# Patient Record
Sex: MALE | Race: WHITE | NOT HISPANIC OR LATINO | Employment: OTHER | URBAN - METROPOLITAN AREA
[De-identification: names, ages, dates, MRNs, and addresses within clinical notes are randomized per-mention and may not be internally consistent; named-entity substitution may affect disease eponyms.]

---

## 2022-09-09 ENCOUNTER — HOSPITAL ENCOUNTER (OUTPATIENT)
Dept: RADIATION ONCOLOGY | Facility: HOSPITAL | Age: 67
Setting detail: RADIATION/ONCOLOGY SERIES
Discharge: HOME | End: 2022-09-09

## 2022-09-23 ENCOUNTER — TELEPHONE (OUTPATIENT)
Dept: RADIATION ONCOLOGY | Facility: HOSPITAL | Age: 67
End: 2022-09-23

## 2022-09-28 ENCOUNTER — OFFICE VISIT (OUTPATIENT)
Dept: RADIATION ONCOLOGY | Facility: HOSPITAL | Age: 67
End: 2022-09-28

## 2022-09-28 VITALS
OXYGEN SATURATION: 98 % | SYSTOLIC BLOOD PRESSURE: 154 MMHG | TEMPERATURE: 97.6 F | RESPIRATION RATE: 20 BRPM | DIASTOLIC BLOOD PRESSURE: 74 MMHG | WEIGHT: 179.5 LBS | HEART RATE: 56 BPM

## 2022-09-28 DIAGNOSIS — C61 PROSTATE CANCER: Primary | ICD-10-CM

## 2022-09-28 PROCEDURE — G0463 HOSPITAL OUTPT CLINIC VISIT: HCPCS

## 2022-09-28 RX ORDER — CHOLECALCIFEROL (VITAMIN D3) 125 MCG
5 CAPSULE ORAL
COMMUNITY
End: 2022-12-13

## 2022-09-28 RX ORDER — DONEPEZIL HYDROCHLORIDE 10 MG/1
10 TABLET, FILM COATED ORAL DAILY
COMMUNITY
Start: 2022-09-10
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
## 2022-09-29 ENCOUNTER — TELEPHONE (OUTPATIENT)
Dept: UROLOGY | Facility: CLINIC | Age: 67
End: 2022-09-29

## 2022-09-29 NOTE — TELEPHONE ENCOUNTER
Spoke to PT about referral to our office and scheduled appt. For 10/19/22 at 3:10pm with Dr. Pineda. Also sending reminder and NP paperwork.

## 2022-10-03 ENCOUNTER — TELEPHONE (OUTPATIENT)
Dept: UROLOGY | Facility: CLINIC | Age: 67
End: 2022-10-03

## 2022-10-03 NOTE — TELEPHONE ENCOUNTER
Provider: DR. BLANCO   Caller: EDWARD HANSEN   Relationship to Patient: SELF    Phone Number: [phone]  Reason for Call: PATIENT IS FILLING OUT NEW PATIENT PAPERWORK AND IS NOT SURE WHEN HIS LAST BIOPSY WAS. HE THINKS IT WAS ON 9/2/22. HE IS NOT SURE WHAT HIS LAST PSA VALUE WAS EITHER. HE IS ASKING IF WE HAVE THE RECORDS FROM DR. WEEMS'S AND DR. CINTRON'S OFFICE THAT SHOWS THIS.     PATIENT STATES HIS LATEST IMAGING ON CD AND REPORT WAS GIVEN TO DR. CINTRON'S OFFICE LAST WEEK ON 9/28/22 AND THEY WOULD UPLOAD IT TO HIS CHART.     PATIENT WILL CONTACT DR. WEEMS'S OFFICE TO CHECK ON BIOPSY DATE AND PSA VALUE BUT WANTS TO MAKE SURE DR. BLANCO HAS THE RECORDS.     PATIENT WAS WARM TRANSFERRED TO DR. CINTRON'S OFFICE TO CHECK ON IMAGING OF CT AND BONE SCAN.     PATIENT REQUESTING A CALL BACK.

## 2022-10-19 ENCOUNTER — OFFICE VISIT (OUTPATIENT)
Dept: UROLOGY | Facility: CLINIC | Age: 67
End: 2022-10-19

## 2022-10-19 VITALS — WEIGHT: 179 LBS

## 2022-10-19 DIAGNOSIS — C61 PROSTATE CANCER: Primary | ICD-10-CM

## 2022-10-19 PROCEDURE — 99204 OFFICE O/P NEW MOD 45 MIN: CPT | Performed by: UROLOGY
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
## 2022-10-26 ENCOUNTER — TELEPHONE (OUTPATIENT)
Dept: UROLOGY | Facility: CLINIC | Age: 67
End: 2022-10-26

## 2022-10-26 ENCOUNTER — OFFICE VISIT (OUTPATIENT)
Dept: UROLOGY | Facility: CLINIC | Age: 67
End: 2022-10-26

## 2022-10-26 VITALS — WEIGHT: 179 LBS

## 2022-10-26 DIAGNOSIS — N48.1 BALANITIS: ICD-10-CM

## 2022-10-26 DIAGNOSIS — C61 PROSTATE CANCER: Primary | ICD-10-CM

## 2022-10-26 PROCEDURE — 99214 OFFICE O/P EST MOD 30 MIN: CPT | Performed by: STUDENT IN AN ORGANIZED HEALTH CARE EDUCATION/TRAINING PROGRAM

## 2022-10-26 RX ORDER — SCOLOPAMINE TRANSDERMAL SYSTEM 1 MG/1
1 PATCH, EXTENDED RELEASE TRANSDERMAL CONTINUOUS
Status: CANCELLED | OUTPATIENT
Start: 2022-10-26 | End: 2022-10-29

## 2022-10-26 RX ORDER — MELOXICAM 7.5 MG/1
15 TABLET ORAL ONCE
Status: CANCELLED | OUTPATIENT
Start: 2022-10-26 | End: 2022-10-26

## 2022-10-26 RX ORDER — CLOTRIMAZOLE AND BETAMETHASONE DIPROPIONATE 10; .64 MG/G; MG/G
1 CREAM TOPICAL 2 TIMES DAILY
Qty: 45 G | Refills: 0 | Status: SHIPPED | OUTPATIENT
Start: 2022-10-26 | End: 2022-11-25

## 2022-10-26 RX ORDER — GABAPENTIN 100 MG/1
600 CAPSULE ORAL ONCE
Status: CANCELLED | OUTPATIENT
Start: 2022-10-26 | End: 2022-10-26

## 2022-10-26 RX ORDER — ACETAMINOPHEN 500 MG
1000 TABLET ORAL ONCE
Status: CANCELLED | OUTPATIENT
Start: 2022-10-26 | End: 2022-10-26

## 2022-10-26 NOTE — TELEPHONE ENCOUNTER
----- Message from Deondre Ocasio MD sent at 10/26/2022 12:23 PM EDT -----  Regarding: need outside path recrods  Need pathology from patient's prostate biopsy [facility] I believe. Performed by Dr Muller.     Also need prostate MRI imaging. Patient will try and get his disc.     Deondre Ocasio MD
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
## 2022-10-26 NOTE — TELEPHONE ENCOUNTER
Verbal Requested from [facility] (Medical Records) - Pathology records from Prostate Biopsy.  [phone]

## 2022-11-01 ENCOUNTER — TELEPHONE (OUTPATIENT)
Dept: UROLOGY | Facility: CLINIC | Age: 67
End: 2022-11-01

## 2022-11-01 NOTE — TELEPHONE ENCOUNTER
Tita from [facility] called and said the auth code doesn't meet for impatient. Requesting a call back. [phone] option 2

## 2022-12-13 ENCOUNTER — PRE-ADMISSION TESTING (OUTPATIENT)
Dept: PREADMISSION TESTING | Facility: HOSPITAL | Age: 67
End: 2022-12-13

## 2022-12-13 VITALS — BODY MASS INDEX: 23.69 KG/M2 | HEIGHT: 71 IN | WEIGHT: 169.2 LBS

## 2022-12-13 DIAGNOSIS — C61 PROSTATE CANCER: ICD-10-CM

## 2022-12-13 DIAGNOSIS — I44.7 LBBB (LEFT BUNDLE BRANCH BLOCK): Primary | ICD-10-CM

## 2022-12-13 DIAGNOSIS — C61 PROSTATE CANCER: Primary | ICD-10-CM

## 2022-12-13 LAB
ABO GROUP BLD: NORMAL
ANION GAP SERPL CALCULATED.3IONS-SCNC: 8 MMOL/L (ref 5–15)
BUN SERPL-MCNC: 11 MG/DL (ref 8–23)
BUN/CREAT SERPL: 11.1 (ref 7–25)
CALCIUM SPEC-SCNC: 9.7 MG/DL (ref 8.6–10.5)
CHLORIDE SERPL-SCNC: 103 MMOL/L (ref 98–107)
CO2 SERPL-SCNC: 31 MMOL/L (ref 22–29)
CREAT SERPL-MCNC: 0.99 MG/DL (ref 0.76–1.27)
DEPRECATED RDW RBC AUTO: 40.3 FL (ref 37–54)
EGFRCR SERPLBLD CKD-EPI 2021: 82.5 ML/MIN/1.73
ERYTHROCYTE [DISTWIDTH] IN BLOOD BY AUTOMATED COUNT: 12.8 % (ref 12.3–15.4)
GLUCOSE SERPL-MCNC: 104 MG/DL (ref 65–99)
HBA1C MFR BLD: 4.9 % (ref 4.8–5.6)
HCT VFR BLD AUTO: 46.8 % (ref 37.5–51)
HGB BLD-MCNC: 15.8 G/DL (ref 13–17.7)
MCH RBC QN AUTO: 29.5 PG (ref 26.6–33)
MCHC RBC AUTO-ENTMCNC: 33.8 G/DL (ref 31.5–35.7)
MCV RBC AUTO: 87.5 FL (ref 79–97)
PLATELET # BLD AUTO: 236 10*3/MM3 (ref 140–450)
PMV BLD AUTO: 10.4 FL (ref 6–12)
POTASSIUM SERPL-SCNC: 4.1 MMOL/L (ref 3.5–5.2)
QT INTERVAL: 464 MS
QTC INTERVAL: 478 MS
RBC # BLD AUTO: 5.35 10*6/MM3 (ref 4.14–5.8)
RH BLD: POSITIVE
SODIUM SERPL-SCNC: 142 MMOL/L (ref 136–145)
WBC NRBC COR # BLD: 5.09 10*3/MM3 (ref 3.4–10.8)

## 2022-12-13 PROCEDURE — 83036 HEMOGLOBIN GLYCOSYLATED A1C: CPT

## 2022-12-13 PROCEDURE — 80048 BASIC METABOLIC PNL TOTAL CA: CPT

## 2022-12-13 PROCEDURE — 86900 BLOOD TYPING SEROLOGIC ABO: CPT

## 2022-12-13 PROCEDURE — 93010 ELECTROCARDIOGRAM REPORT: CPT | Performed by: INTERNAL MEDICINE

## 2022-12-13 PROCEDURE — 93005 ELECTROCARDIOGRAM TRACING: CPT

## 2022-12-13 PROCEDURE — 85027 COMPLETE CBC AUTOMATED: CPT

## 2022-12-13 PROCEDURE — 86901 BLOOD TYPING SEROLOGIC RH(D): CPT

## 2022-12-13 PROCEDURE — 36415 COLL VENOUS BLD VENIPUNCTURE: CPT

## 2022-12-13 RX ORDER — MULTIPLE VITAMINS W/ MINERALS TAB 9MG-400MCG
1 TAB ORAL DAILY
COMMUNITY

## 2022-12-13 NOTE — PAT
Too early to draw type and screen in PAT.  Please obtain blood bank specimen in pre-op on the day of surgery.    Patient to apply Chlorhexadine wipes  to surgical area (as instructed) the night before procedure and the AM of procedure. Wipes provided.    Patient instructed to drink 20 ounces of Gatorade and it needs to be completed 1 hour (for Main OR patients) or 2 hours (scheduled  section & [facility] patients) before given arrival time for procedure (NO RED Gatorade)    Patient verbalized understanding.    Patient denies any current skin issues.     Patient did not review general PAT education video as instructed in their preoperative information received from their surgeon.  One-on-one Pre Admission Testing general education provided during PAT visit.  Copies of PAT general education handouts (Incentive Spirometry, Meds to Beds Program, Patient Belongings, Pre-op skin preparation instructions, Blood Glucose testing, Visitor policy, Surgery FAQ, Code H) distributed to patient. Encouraged patient/family to read PAT general education handouts thoroughly and notify PAT staff with any questions or concerns. Patient instructed to bring PAT pass and completed skin prep sheet (if applicable) on the day of procedure. Patient verbalized understanding of all information and priority content.     EKG showed LBBB; EKG faxed to anesthesia for review.  Called and spoke with Dr Puckett regarding EKG.  Per Dr Puckett patient needs cardiac clearance because no old EKG available showing LBBB.  Called office to notify of need for cardiac clearance.  Spoke with Lavelle,  Who will make arrangements for cardiac clearance.

## 2022-12-14 ENCOUNTER — HOSPITAL ENCOUNTER (OUTPATIENT)
Dept: CARDIOLOGY | Facility: HOSPITAL | Age: 67
Discharge: HOME | End: 2022-12-14
Admitting: PHYSICIAN ASSISTANT

## 2022-12-14 ENCOUNTER — OFFICE VISIT (OUTPATIENT)
Dept: CARDIOLOGY | Facility: CLINIC | Age: 67
End: 2022-12-14

## 2022-12-14 VITALS
BODY MASS INDEX: 24 KG/M2 | WEIGHT: 171.4 LBS | HEIGHT: 71 IN | DIASTOLIC BLOOD PRESSURE: 80 MMHG | SYSTOLIC BLOOD PRESSURE: 124 MMHG | OXYGEN SATURATION: 99 % | HEART RATE: 53 BPM

## 2022-12-14 DIAGNOSIS — I44.7 LBBB (LEFT BUNDLE BRANCH BLOCK): ICD-10-CM

## 2022-12-14 DIAGNOSIS — Z01.818 PREOPERATIVE EVALUATION TO RULE OUT SURGICAL CONTRAINDICATION: Primary | ICD-10-CM

## 2022-12-14 DIAGNOSIS — I44.7 LBBB (LEFT BUNDLE BRANCH BLOCK): Primary | ICD-10-CM

## 2022-12-14 LAB
BH CV ECHO MEAS - AO MAX PG: 7.4 MMHG
BH CV ECHO MEAS - AO MEAN PG: 4.3 MMHG
BH CV ECHO MEAS - AO ROOT DIAM: 3.3 CM
BH CV ECHO MEAS - AO V2 MAX: 136.3 CM/SEC
BH CV ECHO MEAS - AO V2 VTI: 28 CM
BH CV ECHO MEAS - AVA(I,D): 2.48 CM2
BH CV ECHO MEAS - EDV(CUBED): 93.1 ML
BH CV ECHO MEAS - EDV(MOD-SP2): 140 ML
BH CV ECHO MEAS - EDV(MOD-SP4): 69.4 ML
BH CV ECHO MEAS - EF(MOD-BP): 55 %
BH CV ECHO MEAS - EF(MOD-SP2): 53.7 %
BH CV ECHO MEAS - EF(MOD-SP4): 57.1 %
BH CV ECHO MEAS - ESV(CUBED): 33.3 ML
BH CV ECHO MEAS - ESV(MOD-SP2): 64.8 ML
BH CV ECHO MEAS - ESV(MOD-SP4): 29.8 ML
BH CV ECHO MEAS - FS: 29 %
BH CV ECHO MEAS - IVS/LVPW: 1.03 CM
BH CV ECHO MEAS - IVSD: 1.14 CM
BH CV ECHO MEAS - LA DIMENSION: 4 CM
BH CV ECHO MEAS - LAT PEAK E' VEL: 6.9 CM/SEC
BH CV ECHO MEAS - LV DIASTOLIC VOL/BSA (35-75): 35.3 CM2
BH CV ECHO MEAS - LV MASS(C)D: 183.1 GRAMS
BH CV ECHO MEAS - LV MAX PG: 4.9 MMHG
BH CV ECHO MEAS - LV MEAN PG: 2.7 MMHG
BH CV ECHO MEAS - LV SYSTOLIC VOL/BSA (12-30): 15.2 CM2
BH CV ECHO MEAS - LV V1 MAX: 110.3 CM/SEC
BH CV ECHO MEAS - LV V1 VTI: 22.8 CM
BH CV ECHO MEAS - LVIDD: 4.5 CM
BH CV ECHO MEAS - LVIDS: 3.2 CM
BH CV ECHO MEAS - LVOT AREA: 3 CM2
BH CV ECHO MEAS - LVOT DIAM: 1.97 CM
BH CV ECHO MEAS - LVPWD: 1.11 CM
BH CV ECHO MEAS - MED PEAK E' VEL: 6.4 CM/SEC
BH CV ECHO MEAS - MR MAX PG: 65.4 MMHG
BH CV ECHO MEAS - MR MAX VEL: 404.3 CM/SEC
BH CV ECHO MEAS - MR MEAN PG: 37.8 MMHG
BH CV ECHO MEAS - MR MEAN VEL: 287 CM/SEC
BH CV ECHO MEAS - MR VTI: 144.5 CM
BH CV ECHO MEAS - MV A MAX VEL: 68.1 CM/SEC
BH CV ECHO MEAS - MV DEC SLOPE: 157.5 CM/SEC2
BH CV ECHO MEAS - MV DEC TIME: 0.36 MSEC
BH CV ECHO MEAS - MV E MAX VEL: 47.6 CM/SEC
BH CV ECHO MEAS - MV E/A: 0.7
BH CV ECHO MEAS - MV MAX PG: 2.35 MMHG
BH CV ECHO MEAS - MV MEAN PG: 0.9 MMHG
BH CV ECHO MEAS - MV P1/2T: 115.4 MSEC
BH CV ECHO MEAS - MV V2 VTI: 25.2 CM
BH CV ECHO MEAS - MVA(P1/2T): 1.91 CM2
BH CV ECHO MEAS - MVA(VTI): 2.8 CM2
BH CV ECHO MEAS - PA ACC TIME: 0.14 SEC
BH CV ECHO MEAS - PA PR(ACCEL): 18 MMHG
BH CV ECHO MEAS - PA V2 MAX: 95.9 CM/SEC
BH CV ECHO MEAS - RAP SYSTOLE: 3 MMHG
BH CV ECHO MEAS - RVSP: 27 MMHG
BH CV ECHO MEAS - SI(MOD-SP2): 38.3 ML/M2
BH CV ECHO MEAS - SI(MOD-SP4): 20.2 ML/M2
BH CV ECHO MEAS - SV(LVOT): 69.4 ML
BH CV ECHO MEAS - SV(MOD-SP2): 75.2 ML
BH CV ECHO MEAS - SV(MOD-SP4): 39.6 ML
BH CV ECHO MEAS - TAPSE (>1.6): 2.6 CM
BH CV ECHO MEAS - TR MAX PG: 21.4 MMHG
BH CV ECHO MEAS - TR MAX VEL: 230.8 CM/SEC
BH CV ECHO MEASUREMENTS AVERAGE E/E' RATIO: 7.16
BH CV XLRA - RV BASE: 4.3 CM
BH CV XLRA - RV LENGTH: 7.7 CM
BH CV XLRA - RV MID: 3.2 CM
BH CV XLRA - TDI S': 11.6 CM/SEC
LEFT ATRIUM VOLUME INDEX: 21.5 ML/M2
MAXIMAL PREDICTED HEART RATE: 151 BPM
STRESS TARGET HR: 128 BPM

## 2022-12-14 PROCEDURE — 93306 TTE W/DOPPLER COMPLETE: CPT

## 2022-12-14 PROCEDURE — 93000 ELECTROCARDIOGRAM COMPLETE: CPT | Performed by: PHYSICIAN ASSISTANT

## 2022-12-14 PROCEDURE — 93306 TTE W/DOPPLER COMPLETE: CPT | Performed by: INTERNAL MEDICINE

## 2022-12-14 PROCEDURE — 99203 OFFICE O/P NEW LOW 30 MIN: CPT | Performed by: PHYSICIAN ASSISTANT
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
## 2022-12-16 ENCOUNTER — ANESTHESIA EVENT (OUTPATIENT)
Dept: PERIOP | Facility: HOSPITAL | Age: 67
End: 2022-12-16

## 2022-12-16 RX ORDER — SODIUM CHLORIDE 0.9 % (FLUSH) 0.9 %
10 SYRINGE (ML) INJECTION EVERY 12 HOURS SCHEDULED
Status: CANCELLED | OUTPATIENT
Start: 2022-12-16

## 2022-12-16 RX ORDER — SODIUM CHLORIDE 0.9 % (FLUSH) 0.9 %
10 SYRINGE (ML) INJECTION AS NEEDED
Status: CANCELLED | OUTPATIENT
Start: 2022-12-16

## 2022-12-16 RX ORDER — SODIUM CHLORIDE 9 MG/ML
40 INJECTION, SOLUTION INTRAVENOUS AS NEEDED
Status: CANCELLED | OUTPATIENT
Start: 2022-12-16

## 2022-12-16 RX ORDER — FAMOTIDINE 10 MG/ML
20 INJECTION, SOLUTION INTRAVENOUS ONCE
Status: CANCELLED | OUTPATIENT
Start: 2022-12-16 | End: 2022-12-16
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
## 2022-12-19 ENCOUNTER — HOSPITAL ENCOUNTER (OUTPATIENT)
Facility: HOSPITAL | Age: 67
Discharge: HOME | End: 2022-12-20
Attending: STUDENT IN AN ORGANIZED HEALTH CARE EDUCATION/TRAINING PROGRAM | Admitting: STUDENT IN AN ORGANIZED HEALTH CARE EDUCATION/TRAINING PROGRAM

## 2022-12-19 ENCOUNTER — ANESTHESIA (OUTPATIENT)
Dept: PERIOP | Facility: HOSPITAL | Age: 67
End: 2022-12-19

## 2022-12-19 ENCOUNTER — ANESTHESIA EVENT CONVERTED (OUTPATIENT)
Dept: ANESTHESIOLOGY | Facility: HOSPITAL | Age: 67
End: 2022-12-19

## 2022-12-19 ENCOUNTER — APPOINTMENT (OUTPATIENT)
Dept: GENERAL RADIOLOGY | Facility: HOSPITAL | Age: 67
End: 2022-12-19

## 2022-12-19 DIAGNOSIS — C61 PROSTATE CANCER: Primary | ICD-10-CM

## 2022-12-19 LAB
ABO GROUP BLD: NORMAL
ANION GAP SERPL CALCULATED.3IONS-SCNC: 11 MMOL/L (ref 5–15)
BLD GP AB SCN SERPL QL: NEGATIVE
BUN SERPL-MCNC: 10 MG/DL (ref 8–23)
BUN/CREAT SERPL: 9.6 (ref 7–25)
CALCIUM SPEC-SCNC: 8.7 MG/DL (ref 8.6–10.5)
CHLORIDE SERPL-SCNC: 104 MMOL/L (ref 98–107)
CO2 SERPL-SCNC: 27 MMOL/L (ref 22–29)
CREAT SERPL-MCNC: 1.04 MG/DL (ref 0.76–1.27)
DEPRECATED RDW RBC AUTO: 42.7 FL (ref 37–54)
EGFRCR SERPLBLD CKD-EPI 2021: 77.7 ML/MIN/1.73
ERYTHROCYTE [DISTWIDTH] IN BLOOD BY AUTOMATED COUNT: 13.2 % (ref 12.3–15.4)
GLUCOSE SERPL-MCNC: 163 MG/DL (ref 65–99)
HCT VFR BLD AUTO: 40.9 % (ref 37.5–51)
HGB BLD-MCNC: 13.7 G/DL (ref 13–17.7)
MCH RBC QN AUTO: 29.5 PG (ref 26.6–33)
MCHC RBC AUTO-ENTMCNC: 33.5 G/DL (ref 31.5–35.7)
MCV RBC AUTO: 88 FL (ref 79–97)
PLATELET # BLD AUTO: 165 10*3/MM3 (ref 140–450)
PMV BLD AUTO: 10.5 FL (ref 6–12)
POTASSIUM SERPL-SCNC: 3.5 MMOL/L (ref 3.5–5.2)
RBC # BLD AUTO: 4.65 10*6/MM3 (ref 4.14–5.8)
RH BLD: POSITIVE
SODIUM SERPL-SCNC: 142 MMOL/L (ref 136–145)
T&S EXPIRATION DATE: NORMAL
WBC NRBC COR # BLD: 10.58 10*3/MM3 (ref 3.4–10.8)

## 2022-12-19 PROCEDURE — 38571 LAPAROSCOPY LYMPHADENECTOMY: CPT | Performed by: STUDENT IN AN ORGANIZED HEALTH CARE EDUCATION/TRAINING PROGRAM

## 2022-12-19 PROCEDURE — A9270 NON-COVERED ITEM OR SERVICE: HCPCS | Performed by: STUDENT IN AN ORGANIZED HEALTH CARE EDUCATION/TRAINING PROGRAM

## 2022-12-19 PROCEDURE — 86900 BLOOD TYPING SEROLOGIC ABO: CPT | Performed by: STUDENT IN AN ORGANIZED HEALTH CARE EDUCATION/TRAINING PROGRAM

## 2022-12-19 PROCEDURE — 25010000002 DEXAMETHASONE SODIUM PHOSPHATE 10 MG/ML SOLUTION: Performed by: ANESTHESIOLOGY

## 2022-12-19 PROCEDURE — A9270 NON-COVERED ITEM OR SERVICE: HCPCS | Performed by: ANESTHESIOLOGY

## 2022-12-19 PROCEDURE — G0378 HOSPITAL OBSERVATION PER HR: HCPCS

## 2022-12-19 PROCEDURE — 63710000001 DONEPEZIL 5 MG TABLET: Performed by: STUDENT IN AN ORGANIZED HEALTH CARE EDUCATION/TRAINING PROGRAM

## 2022-12-19 PROCEDURE — 63710000001 SENNOSIDES-DOCUSATE 8.6-50 MG TABLET: Performed by: STUDENT IN AN ORGANIZED HEALTH CARE EDUCATION/TRAINING PROGRAM

## 2022-12-19 PROCEDURE — 63710000001 ACETAMINOPHEN 325 MG TABLET: Performed by: STUDENT IN AN ORGANIZED HEALTH CARE EDUCATION/TRAINING PROGRAM

## 2022-12-19 PROCEDURE — 63710000001 FAMOTIDINE 20 MG TABLET: Performed by: ANESTHESIOLOGY

## 2022-12-19 PROCEDURE — 63710000001 GABAPENTIN 300 MG CAPSULE: Performed by: STUDENT IN AN ORGANIZED HEALTH CARE EDUCATION/TRAINING PROGRAM

## 2022-12-19 PROCEDURE — 63710000001 POLYETHYLENE GLYCOL 17 G PACK: Performed by: STUDENT IN AN ORGANIZED HEALTH CARE EDUCATION/TRAINING PROGRAM

## 2022-12-19 PROCEDURE — 80048 BASIC METABOLIC PNL TOTAL CA: CPT | Performed by: STUDENT IN AN ORGANIZED HEALTH CARE EDUCATION/TRAINING PROGRAM

## 2022-12-19 PROCEDURE — 85027 COMPLETE CBC AUTOMATED: CPT | Performed by: STUDENT IN AN ORGANIZED HEALTH CARE EDUCATION/TRAINING PROGRAM

## 2022-12-19 PROCEDURE — 25010000002 FENTANYL CITRATE (PF) 100 MCG/2ML SOLUTION: Performed by: ANESTHESIOLOGY

## 2022-12-19 PROCEDURE — 88309 TISSUE EXAM BY PATHOLOGIST: CPT | Performed by: STUDENT IN AN ORGANIZED HEALTH CARE EDUCATION/TRAINING PROGRAM

## 2022-12-19 PROCEDURE — 99024 POSTOP FOLLOW-UP VISIT: CPT | Performed by: STUDENT IN AN ORGANIZED HEALTH CARE EDUCATION/TRAINING PROGRAM

## 2022-12-19 PROCEDURE — 63710000001 GABAPENTIN 100 MG CAPSULE: Performed by: STUDENT IN AN ORGANIZED HEALTH CARE EDUCATION/TRAINING PROGRAM

## 2022-12-19 PROCEDURE — 25010000002 ONDANSETRON PER 1 MG: Performed by: ANESTHESIOLOGY

## 2022-12-19 PROCEDURE — 63710000001 OXYCODONE 5 MG TABLET: Performed by: STUDENT IN AN ORGANIZED HEALTH CARE EDUCATION/TRAINING PROGRAM

## 2022-12-19 PROCEDURE — 55866 LAPS SURG PRST8ECT RPBIC RAD: CPT | Performed by: UROLOGY

## 2022-12-19 PROCEDURE — 63710000001 MELOXICAM 15 MG TABLET: Performed by: STUDENT IN AN ORGANIZED HEALTH CARE EDUCATION/TRAINING PROGRAM

## 2022-12-19 PROCEDURE — 88304 TISSUE EXAM BY PATHOLOGIST: CPT | Performed by: STUDENT IN AN ORGANIZED HEALTH CARE EDUCATION/TRAINING PROGRAM

## 2022-12-19 PROCEDURE — 63710000001 ACETAMINOPHEN 500 MG TABLET: Performed by: STUDENT IN AN ORGANIZED HEALTH CARE EDUCATION/TRAINING PROGRAM

## 2022-12-19 PROCEDURE — 55866 LAPS SURG PRST8ECT RPBIC RAD: CPT | Performed by: STUDENT IN AN ORGANIZED HEALTH CARE EDUCATION/TRAINING PROGRAM

## 2022-12-19 PROCEDURE — 25010000002 HEPARIN (PORCINE) PER 1000 UNITS: Performed by: STUDENT IN AN ORGANIZED HEALTH CARE EDUCATION/TRAINING PROGRAM

## 2022-12-19 PROCEDURE — 63710000001 BACITRACIN 500 UNIT/GM OINTMENT 30 G TUBE: Performed by: STUDENT IN AN ORGANIZED HEALTH CARE EDUCATION/TRAINING PROGRAM

## 2022-12-19 PROCEDURE — 25010000002 PROPOFOL 10 MG/ML EMULSION: Performed by: ANESTHESIOLOGY

## 2022-12-19 PROCEDURE — 63710000001 OXYBUTYNIN XL 5 MG TABLET SUSTAINED-RELEASE 24 HOUR: Performed by: STUDENT IN AN ORGANIZED HEALTH CARE EDUCATION/TRAINING PROGRAM

## 2022-12-19 PROCEDURE — 25010000002 CEFAZOLIN IN DEXTROSE 2-4 GM/100ML-% SOLUTION: Performed by: STUDENT IN AN ORGANIZED HEALTH CARE EDUCATION/TRAINING PROGRAM

## 2022-12-19 PROCEDURE — 86901 BLOOD TYPING SEROLOGIC RH(D): CPT | Performed by: STUDENT IN AN ORGANIZED HEALTH CARE EDUCATION/TRAINING PROGRAM

## 2022-12-19 PROCEDURE — 86850 RBC ANTIBODY SCREEN: CPT | Performed by: STUDENT IN AN ORGANIZED HEALTH CARE EDUCATION/TRAINING PROGRAM

## 2022-12-19 PROCEDURE — 88305 TISSUE EXAM BY PATHOLOGIST: CPT | Performed by: STUDENT IN AN ORGANIZED HEALTH CARE EDUCATION/TRAINING PROGRAM

## 2022-12-19 DEVICE — THE ECHELON, ECHELON ENDOPATH™ AND ECHELON FLEX™ FAMILIES OF ENDOSCOPIC LINEAR CUTTERS AND RELOADS ARE STERILE, SINGLE PATIENT USE INSTRUMENTS THAT SIMULTANEOUSLY CUT AND STAPLE TISSUE. THERE ARE SIX STAGGERED ROWS OF STAPLES, THREE ON EITHER SIDE OF THE CUT LINE. THE 45 MM INSTRUMENTS HAVE A STAPLE LINE THATIS APPROXIMATELY 45 MM LONG AND A CUT LINE THAT IS APPROXIMATELY 42 MM LONG. THE SHAFT CAN ROTATE FREELY IN BOTH DIRECTIONS AND AN ARTICULATION MECHANISM ON ARTICULATING INSTRUMENTS ENABLES BENDING THE DISTAL PORTIONOF THE SHAFT TO FACILITATE LATERAL ACCESS OF THE OPERATIVE SITE.THE INSTRUMENTS ARE SHIPPED WITHOUT A RELOAD AND MUST BE LOADED PRIOR TO USE. A STAPLE RETAINING CAP ON THE RELOAD PROTECTS THE STAPLE LEG POINTS DURING SHIPPING AND TRANSPORTATION. THE INSTRUMENTS’ LOCK-OUT FEATURE IS DESIGNED TO PREVENT A USED RELOAD FROM BEING REFIRED.
Type: IMPLANTABLE DEVICE | Site: ABDOMEN | Status: FUNCTIONAL
Brand: ECHELON ENDOPATH

## 2022-12-19 DEVICE — HEMOLOK L 6 CLIPS/CART
Type: IMPLANTABLE DEVICE | Site: ABDOMEN | Status: FUNCTIONAL
Brand: WECK

## 2022-12-19 DEVICE — DEV CONTRL TISS STRATAFIX SPIRAL PDS PLS SH 3/0 6IN 15CM VIL: Type: IMPLANTABLE DEVICE | Site: ABDOMEN | Status: FUNCTIONAL

## 2022-12-19 RX ORDER — HYDROMORPHONE HYDROCHLORIDE 1 MG/ML
0.5 INJECTION, SOLUTION INTRAMUSCULAR; INTRAVENOUS; SUBCUTANEOUS
Status: DISCONTINUED | OUTPATIENT
Start: 2022-12-19 | End: 2022-12-19 | Stop reason: HOSPADM

## 2022-12-19 RX ORDER — DIAPER,BRIEF,INFANT-TODD,DISP
1 EACH MISCELLANEOUS EVERY 12 HOURS SCHEDULED
Status: DISCONTINUED | OUTPATIENT
Start: 2022-12-19 | End: 2022-12-20 | Stop reason: HOSPADM

## 2022-12-19 RX ORDER — LABETALOL HYDROCHLORIDE 5 MG/ML
INJECTION, SOLUTION INTRAVENOUS AS NEEDED
Status: DISCONTINUED | OUTPATIENT
Start: 2022-12-19 | End: 2022-12-19 | Stop reason: SURG

## 2022-12-19 RX ORDER — ONDANSETRON 2 MG/ML
4 INJECTION INTRAMUSCULAR; INTRAVENOUS ONCE AS NEEDED
Status: DISCONTINUED | OUTPATIENT
Start: 2022-12-19 | End: 2022-12-19 | Stop reason: HOSPADM

## 2022-12-19 RX ORDER — LIDOCAINE HYDROCHLORIDE 10 MG/ML
0.5 INJECTION, SOLUTION EPIDURAL; INFILTRATION; INTRACAUDAL; PERINEURAL ONCE AS NEEDED
Status: COMPLETED | OUTPATIENT
Start: 2022-12-19 | End: 2022-12-19

## 2022-12-19 RX ORDER — ACETAMINOPHEN 500 MG
1000 TABLET ORAL ONCE
Status: COMPLETED | OUTPATIENT
Start: 2022-12-19 | End: 2022-12-19

## 2022-12-19 RX ORDER — MAGNESIUM HYDROXIDE 1200 MG/15ML
LIQUID ORAL AS NEEDED
Status: DISCONTINUED | OUTPATIENT
Start: 2022-12-19 | End: 2022-12-19 | Stop reason: HOSPADM

## 2022-12-19 RX ORDER — SODIUM CHLORIDE 9 MG/ML
INJECTION, SOLUTION INTRAVENOUS AS NEEDED
Status: DISCONTINUED | OUTPATIENT
Start: 2022-12-19 | End: 2022-12-19 | Stop reason: HOSPADM

## 2022-12-19 RX ORDER — DONEPEZIL HYDROCHLORIDE 5 MG/1
10 TABLET, FILM COATED ORAL DAILY
Status: DISCONTINUED | OUTPATIENT
Start: 2022-12-19 | End: 2022-12-20 | Stop reason: HOSPADM

## 2022-12-19 RX ORDER — MIDAZOLAM HYDROCHLORIDE 1 MG/ML
0.5 INJECTION INTRAMUSCULAR; INTRAVENOUS
Status: DISCONTINUED | OUTPATIENT
Start: 2022-12-19 | End: 2022-12-19 | Stop reason: HOSPADM

## 2022-12-19 RX ORDER — FAMOTIDINE 20 MG/1
20 TABLET, FILM COATED ORAL ONCE
Status: COMPLETED | OUTPATIENT
Start: 2022-12-19 | End: 2022-12-19

## 2022-12-19 RX ORDER — SODIUM CHLORIDE 9 MG/ML
100 INJECTION, SOLUTION INTRAVENOUS CONTINUOUS
Status: DISCONTINUED | OUTPATIENT
Start: 2022-12-19 | End: 2022-12-20

## 2022-12-19 RX ORDER — DEXAMETHASONE SODIUM PHOSPHATE 10 MG/ML
INJECTION, SOLUTION INTRAMUSCULAR; INTRAVENOUS
Status: COMPLETED | OUTPATIENT
Start: 2022-12-19 | End: 2022-12-19

## 2022-12-19 RX ORDER — PROPOFOL 10 MG/ML
VIAL (ML) INTRAVENOUS AS NEEDED
Status: DISCONTINUED | OUTPATIENT
Start: 2022-12-19 | End: 2022-12-19 | Stop reason: SURG

## 2022-12-19 RX ORDER — SODIUM CHLORIDE, SODIUM LACTATE, POTASSIUM CHLORIDE, CALCIUM CHLORIDE 600; 310; 30; 20 MG/100ML; MG/100ML; MG/100ML; MG/100ML
9 INJECTION, SOLUTION INTRAVENOUS CONTINUOUS
Status: DISCONTINUED | OUTPATIENT
Start: 2022-12-19 | End: 2022-12-19

## 2022-12-19 RX ORDER — ONDANSETRON 2 MG/ML
INJECTION INTRAMUSCULAR; INTRAVENOUS AS NEEDED
Status: DISCONTINUED | OUTPATIENT
Start: 2022-12-19 | End: 2022-12-19 | Stop reason: SURG

## 2022-12-19 RX ORDER — FENTANYL CITRATE 50 UG/ML
INJECTION, SOLUTION INTRAMUSCULAR; INTRAVENOUS AS NEEDED
Status: DISCONTINUED | OUTPATIENT
Start: 2022-12-19 | End: 2022-12-19 | Stop reason: SURG

## 2022-12-19 RX ORDER — EPHEDRINE SULFATE 50 MG/ML
INJECTION INTRAVENOUS AS NEEDED
Status: DISCONTINUED | OUTPATIENT
Start: 2022-12-19 | End: 2022-12-19 | Stop reason: SURG

## 2022-12-19 RX ORDER — AMOXICILLIN 250 MG
1 CAPSULE ORAL 2 TIMES DAILY
Status: DISCONTINUED | OUTPATIENT
Start: 2022-12-19 | End: 2022-12-20 | Stop reason: HOSPADM

## 2022-12-19 RX ORDER — CEFAZOLIN SODIUM 2 G/100ML
2 INJECTION, SOLUTION INTRAVENOUS ONCE
Status: COMPLETED | OUTPATIENT
Start: 2022-12-19 | End: 2022-12-19

## 2022-12-19 RX ORDER — ROCURONIUM BROMIDE 10 MG/ML
INJECTION, SOLUTION INTRAVENOUS AS NEEDED
Status: DISCONTINUED | OUTPATIENT
Start: 2022-12-19 | End: 2022-12-19 | Stop reason: SURG

## 2022-12-19 RX ORDER — ACETAMINOPHEN 160 MG/5ML
650 SOLUTION ORAL EVERY 6 HOURS
Status: DISCONTINUED | OUTPATIENT
Start: 2022-12-19 | End: 2022-12-20 | Stop reason: HOSPADM

## 2022-12-19 RX ORDER — ACETAMINOPHEN 650 MG/1
650 SUPPOSITORY RECTAL EVERY 6 HOURS
Status: DISCONTINUED | OUTPATIENT
Start: 2022-12-19 | End: 2022-12-20 | Stop reason: HOSPADM

## 2022-12-19 RX ORDER — HYOSCYAMINE SULFATE 0.125 MG
125 TABLET,DISINTEGRATING ORAL EVERY 4 HOURS PRN
Status: DISCONTINUED | OUTPATIENT
Start: 2022-12-19 | End: 2022-12-20 | Stop reason: HOSPADM

## 2022-12-19 RX ORDER — BUPIVACAINE HYDROCHLORIDE 2.5 MG/ML
INJECTION, SOLUTION EPIDURAL; INFILTRATION; INTRACAUDAL
Status: COMPLETED | OUTPATIENT
Start: 2022-12-19 | End: 2022-12-19

## 2022-12-19 RX ORDER — GABAPENTIN 300 MG/1
600 CAPSULE ORAL ONCE
Status: COMPLETED | OUTPATIENT
Start: 2022-12-19 | End: 2022-12-19

## 2022-12-19 RX ORDER — ENOXAPARIN SODIUM 100 MG/ML
40 INJECTION SUBCUTANEOUS DAILY
Status: DISCONTINUED | OUTPATIENT
Start: 2022-12-20 | End: 2022-12-20 | Stop reason: HOSPADM

## 2022-12-19 RX ORDER — DEXAMETHASONE SODIUM PHOSPHATE 10 MG/ML
INJECTION, SOLUTION INTRAMUSCULAR; INTRAVENOUS AS NEEDED
Status: DISCONTINUED | OUTPATIENT
Start: 2022-12-19 | End: 2022-12-19 | Stop reason: SURG

## 2022-12-19 RX ORDER — HYDROMORPHONE HYDROCHLORIDE 1 MG/ML
0.5 INJECTION, SOLUTION INTRAMUSCULAR; INTRAVENOUS; SUBCUTANEOUS
Status: DISCONTINUED | OUTPATIENT
Start: 2022-12-19 | End: 2022-12-20 | Stop reason: HOSPADM

## 2022-12-19 RX ORDER — GABAPENTIN 100 MG/1
100 CAPSULE ORAL 3 TIMES DAILY
Status: DISCONTINUED | OUTPATIENT
Start: 2022-12-19 | End: 2022-12-20 | Stop reason: HOSPADM

## 2022-12-19 RX ORDER — MELOXICAM 15 MG/1
15 TABLET ORAL ONCE
Status: COMPLETED | OUTPATIENT
Start: 2022-12-19 | End: 2022-12-19

## 2022-12-19 RX ORDER — LIDOCAINE HYDROCHLORIDE 10 MG/ML
INJECTION, SOLUTION EPIDURAL; INFILTRATION; INTRACAUDAL; PERINEURAL AS NEEDED
Status: DISCONTINUED | OUTPATIENT
Start: 2022-12-19 | End: 2022-12-19 | Stop reason: SURG

## 2022-12-19 RX ORDER — ACETAMINOPHEN 325 MG/1
650 TABLET ORAL EVERY 6 HOURS
Status: DISCONTINUED | OUTPATIENT
Start: 2022-12-19 | End: 2022-12-20 | Stop reason: HOSPADM

## 2022-12-19 RX ORDER — OXYCODONE HYDROCHLORIDE 5 MG/1
5 TABLET ORAL EVERY 4 HOURS PRN
Status: DISCONTINUED | OUTPATIENT
Start: 2022-12-19 | End: 2022-12-20 | Stop reason: HOSPADM

## 2022-12-19 RX ORDER — BUPIVACAINE HCL/0.9 % NACL/PF 0.125 %
PLASTIC BAG, INJECTION (ML) EPIDURAL AS NEEDED
Status: DISCONTINUED | OUTPATIENT
Start: 2022-12-19 | End: 2022-12-19 | Stop reason: SURG

## 2022-12-19 RX ORDER — OXYBUTYNIN CHLORIDE 5 MG/1
5 TABLET, EXTENDED RELEASE ORAL DAILY
Status: DISCONTINUED | OUTPATIENT
Start: 2022-12-19 | End: 2022-12-20 | Stop reason: HOSPADM

## 2022-12-19 RX ORDER — CEFAZOLIN SODIUM 2 G/100ML
2 INJECTION, SOLUTION INTRAVENOUS EVERY 8 HOURS
Status: COMPLETED | OUTPATIENT
Start: 2022-12-19 | End: 2022-12-20

## 2022-12-19 RX ORDER — SODIUM CHLORIDE, SODIUM LACTATE, POTASSIUM CHLORIDE, CALCIUM CHLORIDE 600; 310; 30; 20 MG/100ML; MG/100ML; MG/100ML; MG/100ML
100 INJECTION, SOLUTION INTRAVENOUS CONTINUOUS
Status: DISCONTINUED | OUTPATIENT
Start: 2022-12-19 | End: 2022-12-19

## 2022-12-19 RX ORDER — FENTANYL CITRATE 50 UG/ML
50 INJECTION, SOLUTION INTRAMUSCULAR; INTRAVENOUS
Status: DISCONTINUED | OUTPATIENT
Start: 2022-12-19 | End: 2022-12-19 | Stop reason: HOSPADM

## 2022-12-19 RX ORDER — HEPARIN SODIUM 5000 [USP'U]/ML
INJECTION, SOLUTION INTRAVENOUS; SUBCUTANEOUS AS NEEDED
Status: DISCONTINUED | OUTPATIENT
Start: 2022-12-19 | End: 2022-12-19 | Stop reason: HOSPADM

## 2022-12-19 RX ORDER — POLYETHYLENE GLYCOL 3350 17 G/17G
17 POWDER, FOR SOLUTION ORAL DAILY
Status: DISCONTINUED | OUTPATIENT
Start: 2022-12-19 | End: 2022-12-20 | Stop reason: HOSPADM

## 2022-12-19 RX ORDER — NALOXONE HCL 0.4 MG/ML
0.4 VIAL (ML) INJECTION AS NEEDED
Status: DISCONTINUED | OUTPATIENT
Start: 2022-12-19 | End: 2022-12-19 | Stop reason: HOSPADM

## 2022-12-19 RX ORDER — EPHEDRINE SULFATE 50 MG/ML
5 INJECTION, SOLUTION INTRAVENOUS ONCE AS NEEDED
Status: DISCONTINUED | OUTPATIENT
Start: 2022-12-19 | End: 2022-12-19 | Stop reason: HOSPADM

## 2022-12-19 RX ADMIN — ROCURONIUM BROMIDE 50 MG: 50 INJECTION INTRAVENOUS at 07:32

## 2022-12-19 RX ADMIN — Medication 100 MCG: at 08:30

## 2022-12-19 RX ADMIN — Medication 100 MCG: at 10:28

## 2022-12-19 RX ADMIN — CEFAZOLIN SODIUM 2 G: 2 INJECTION, SOLUTION INTRAVENOUS at 07:40

## 2022-12-19 RX ADMIN — EPHEDRINE SULFATE 5 MG: 50 INJECTION INTRAVENOUS at 09:25

## 2022-12-19 RX ADMIN — Medication 100 MCG: at 07:56

## 2022-12-19 RX ADMIN — SODIUM CHLORIDE 100 ML/HR: 9 INJECTION, SOLUTION INTRAVENOUS at 23:51

## 2022-12-19 RX ADMIN — FAMOTIDINE 20 MG: 20 TABLET, FILM COATED ORAL at 06:56

## 2022-12-19 RX ADMIN — ACETAMINOPHEN 650 MG: 325 TABLET ORAL at 14:22

## 2022-12-19 RX ADMIN — PROPOFOL 25 MCG/KG/MIN: 10 INJECTION, EMULSION INTRAVENOUS at 07:35

## 2022-12-19 RX ADMIN — DONEPEZIL HYDROCHLORIDE 10 MG: 5 TABLET ORAL at 14:22

## 2022-12-19 RX ADMIN — Medication 100 MCG: at 10:03

## 2022-12-19 RX ADMIN — FENTANYL CITRATE 100 MCG: 50 INJECTION INTRAMUSCULAR; INTRAVENOUS at 07:32

## 2022-12-19 RX ADMIN — ROCURONIUM BROMIDE 10 MG: 50 INJECTION INTRAVENOUS at 08:52

## 2022-12-19 RX ADMIN — CEFAZOLIN SODIUM 2 G: 2 INJECTION, SOLUTION INTRAVENOUS at 14:22

## 2022-12-19 RX ADMIN — LABETALOL 20 MG/4 ML (5 MG/ML) INTRAVENOUS SYRINGE 5 MG: at 08:08

## 2022-12-19 RX ADMIN — ACETAMINOPHEN 1000 MG: 500 TABLET ORAL at 06:56

## 2022-12-19 RX ADMIN — Medication 100 MCG: at 07:45

## 2022-12-19 RX ADMIN — ACETAMINOPHEN 650 MG: 325 TABLET ORAL at 21:41

## 2022-12-19 RX ADMIN — ROCURONIUM BROMIDE 10 MG: 50 INJECTION INTRAVENOUS at 10:03

## 2022-12-19 RX ADMIN — GABAPENTIN 100 MG: 100 CAPSULE ORAL at 16:32

## 2022-12-19 RX ADMIN — ROCURONIUM BROMIDE 10 MG: 50 INJECTION INTRAVENOUS at 09:25

## 2022-12-19 RX ADMIN — EPHEDRINE SULFATE 5 MG: 50 INJECTION INTRAVENOUS at 08:25

## 2022-12-19 RX ADMIN — Medication 100 MCG: at 08:45

## 2022-12-19 RX ADMIN — BACITRACIN 1 APPLICATION: 500 OINTMENT TOPICAL at 21:42

## 2022-12-19 RX ADMIN — SUGAMMADEX 200 MG: 100 INJECTION, SOLUTION INTRAVENOUS at 11:24

## 2022-12-19 RX ADMIN — DEXAMETHASONE SODIUM PHOSPHATE 4 MG: 10 INJECTION, SOLUTION INTRAMUSCULAR; INTRAVENOUS at 07:34

## 2022-12-19 RX ADMIN — BUPIVACAINE HYDROCHLORIDE 60 ML: 2.5 INJECTION, SOLUTION EPIDURAL; INFILTRATION; INTRACAUDAL; PERINEURAL at 07:34

## 2022-12-19 RX ADMIN — ROCURONIUM BROMIDE 10 MG: 50 INJECTION INTRAVENOUS at 11:05

## 2022-12-19 RX ADMIN — GABAPENTIN 600 MG: 300 CAPSULE ORAL at 06:56

## 2022-12-19 RX ADMIN — SENNOSIDES AND DOCUSATE SODIUM 1 TABLET: 50; 8.6 TABLET ORAL at 21:41

## 2022-12-19 RX ADMIN — EPHEDRINE SULFATE 5 MG: 50 INJECTION INTRAVENOUS at 07:56

## 2022-12-19 RX ADMIN — OXYCODONE 5 MG: 5 TABLET ORAL at 14:23

## 2022-12-19 RX ADMIN — CEFAZOLIN SODIUM 2 G: 2 INJECTION, SOLUTION INTRAVENOUS at 23:57

## 2022-12-19 RX ADMIN — POLYETHYLENE GLYCOL 3350 17 G: 17 POWDER, FOR SOLUTION ORAL at 14:22

## 2022-12-19 RX ADMIN — SODIUM CHLORIDE 100 ML/HR: 9 INJECTION, SOLUTION INTRAVENOUS at 14:15

## 2022-12-19 RX ADMIN — LIDOCAINE HYDROCHLORIDE 0.5 ML: 10 INJECTION, SOLUTION EPIDURAL; INFILTRATION; INTRACAUDAL; PERINEURAL at 06:56

## 2022-12-19 RX ADMIN — EPHEDRINE SULFATE 5 MG: 50 INJECTION INTRAVENOUS at 10:03

## 2022-12-19 RX ADMIN — DEXAMETHASONE SODIUM PHOSPHATE 4 MG: 10 INJECTION INTRAMUSCULAR; INTRAVENOUS at 07:35

## 2022-12-19 RX ADMIN — OXYBUTYNIN CHLORIDE 5 MG: 5 TABLET, EXTENDED RELEASE ORAL at 14:23

## 2022-12-19 RX ADMIN — LIDOCAINE HYDROCHLORIDE 50 MG: 10 INJECTION, SOLUTION EPIDURAL; INFILTRATION; INTRACAUDAL; PERINEURAL at 07:32

## 2022-12-19 RX ADMIN — EPHEDRINE SULFATE 5 MG: 50 INJECTION INTRAVENOUS at 08:30

## 2022-12-19 RX ADMIN — ONDANSETRON 4 MG: 2 INJECTION INTRAMUSCULAR; INTRAVENOUS at 11:22

## 2022-12-19 RX ADMIN — EPHEDRINE SULFATE 5 MG: 50 INJECTION INTRAVENOUS at 08:45

## 2022-12-19 RX ADMIN — ROCURONIUM BROMIDE 20 MG: 50 INJECTION INTRAVENOUS at 08:03

## 2022-12-19 RX ADMIN — PROPOFOL 200 MG: 10 INJECTION, EMULSION INTRAVENOUS at 07:32

## 2022-12-19 RX ADMIN — MELOXICAM 15 MG: 15 TABLET ORAL at 06:56

## 2022-12-19 RX ADMIN — BACITRACIN 1 APPLICATION: 500 OINTMENT TOPICAL at 14:22

## 2022-12-19 RX ADMIN — EPHEDRINE SULFATE 10 MG: 50 INJECTION INTRAVENOUS at 07:51

## 2022-12-19 RX ADMIN — GABAPENTIN 100 MG: 100 CAPSULE ORAL at 21:41

## 2022-12-19 RX ADMIN — SODIUM CHLORIDE, POTASSIUM CHLORIDE, SODIUM LACTATE AND CALCIUM CHLORIDE 9 ML/HR: 600; 310; 30; 20 INJECTION, SOLUTION INTRAVENOUS at 06:57

## 2022-12-19 RX ADMIN — SODIUM CHLORIDE, POTASSIUM CHLORIDE, SODIUM LACTATE AND CALCIUM CHLORIDE: 600; 310; 30; 20 INJECTION, SOLUTION INTRAVENOUS at 11:06

## 2022-12-19 NOTE — ANESTHESIA PROCEDURE NOTES
"Peripheral Block      Patient reassessed immediately prior to procedure    Patient location during procedure: OR  Reason for block: at surgeon's request and post-op pain management  Performed by  CRNA/CAA: Nayan Vivar, JOSE: Drake Silva SRNA  Preanesthetic Checklist  Completed: patient identified, IV checked, site marked, risks and benefits discussed, surgical consent, monitors and equipment checked, pre-op evaluation and timeout performed  Prep:  Pt Position: supine  Sterile barriers:cap, gloves, mask and washed/disinfected hands  Prep: ChloraPrep  Patient monitoring: blood pressure monitoring, continuous pulse oximetry and EKG  Procedure    Sedation: yes  Performed under: general  Guidance:ultrasound guided  Images:still images obtained, printed/placed on chart    Laterality:Bilateral  Block Type:TAP  Injection Technique:single-shot  Needle Type:short-bevel and echogenic  Needle Gauge:20 G  Resistance on Injection: none    Medications Used: bupivacaine PF (MARCAINE) 0.25 % injection - Injection   60 mL - 12/19/2022 7:34:00 AM  dexamethasone sodium phosphate injection - Injection   4 mg - 12/19/2022 7:34:00 AM      Medications  Comment:Block Injection:  LA dose divided between Right and Left block        Post Assessment  Injection Assessment: negative aspiration for heme, incremental injection and no paresthesia on injection  Patient Tolerance:comfortable throughout block  Complications:no  Additional Notes    Subcostal TAPs    A high-frequency linear transducer, with sterile cover, was placed sub-xiphoid to identify [place], right and left Rectus Abdominus Muscles (KYUNG). The transducer was moved either right or left subcostally to identify the KYUNG and the Transverse Abdominus Muscle (GARZON). The insertion site was prepped in sterile fashion and then localized with 2-5 ml of 1% Lidocaine. Using ultrasound-guidance, a 20-gauge B-Hill 4\" Ultraplex 360 non-stimulating echogenic needle was advanced in plane, " from medial to lateral, until the tip of the needle was in the fascial plane between the KYUNG and GARZON. 1-3ml of preservative free normal saline was used to hydro-dissect the fascial planes. After the fascial plane was verified, the local anesthetic (LA) was injected. The procedure was repeated on the opposite side for bilateral coverage. Aspiration every 5 ml to prevent intravascular injection. Injection was completed with negative aspiration of blood and negative intravascular injection. Injection pressures were normal with minimal resistance. The subcostal approach to the TAP nerve block ideally anesthetizes the intercostal nerves T6-T9.

## 2022-12-19 NOTE — ANESTHESIA POSTPROCEDURE EVALUATION
Patient: Fransico James    Procedure Summary     Date: 12/19/22 Room / Location:  [facility] /  [facility]    Anesthesia Start: 0730 Anesthesia Stop: 1159    Procedure: ROBOTIC ASSISTED LAPAROSCOPIC PROSTATECTOMY, BILATERAL PELVIC LYMPH NODE DISSECTION, RIGHT NERVE SPARING (Abdomen) Diagnosis:       Prostate cancer (HCC)      (Prostate cancer (HCC) [C61])    Surgeons: Deondre Ocasio MD Provider: Alfred Freitas MD    Anesthesia Type: general with block ASA Status: 3          Anesthesia Type: general with block    Vitals  Vitals Value Taken Time   BP     Temp     Pulse     Resp     SpO2 100 % 12/19/22 1158   Vitals shown include unvalidated device data.        Post Anesthesia Care and Evaluation    Patient location during evaluation: PACU  Patient participation: complete - patient participated  Level of consciousness: awake and alert  Pain score: 0  Pain management: adequate    Airway patency: patent  Anesthetic complications: No anesthetic complications  PONV Status: none  Cardiovascular status: hemodynamically stable and acceptable  Respiratory status: nonlabored ventilation, acceptable and nasal cannula  Hydration status: acceptable

## 2022-12-19 NOTE — BRIEF OP NOTE
PROSTATECTOMY LAPAROSCOPIC WITH DAVINCI ROBOT  Progress Note    Fransico James  12/19/2022    Pre-op Diagnosis:   Prostate cancer (HCC) [C61]       Post-Op Diagnosis Codes:     * Prostate cancer (HCC) [C61]         Procedure(s):  ROBOTIC ASSISTED LAPAROSCOPIC PROSTATECTOMY, BILATERAL PELVIC LYMPH NODE DISSECTION, RIGHT NERVE SPARING        Surgeon(s):  Deondre Ocasio MD Bhalodi, Amul, MD    Anesthesia: General    Staff:   Circulator: Lucrecia Thomson, FERNIE; Elisabeth Barraza RN  Scrub Person: Nicki Squires; Sadie Rainey  Nursing Assistant: Garry Kay PCT         Estimated Blood Loss: 75 mL    Urine Voided: * No values recorded between 12/19/2022  7:30 AM and 12/19/2022 11:55 AM *    Specimens:                Specimens     ID Source Type Tests Collected By Collected At Frozen?    A Pelvis Lymph Node · TISSUE PATHOLOGY EXAM   Deondre Ocasio MD 12/19/22 0927 No    Description: Right pelvic lymph node    B Pelvis Lymph Node · TISSUE PATHOLOGY EXAM   Deondre Ocasio MD 12/19/22 0921 No    Description: Left Pelvic Lymph Node    C Pelvis Tissue · TISSUE PATHOLOGY EXAM   Deondre Ocasio MD 12/19/22 0948 No    Description: Periprostatic fat    D Prostate Tissue · TISSUE PATHOLOGY EXAM   Deondre Ocasio MD 12/19/22 1120 No    Description: prostate                Drains:   Closed/Suction Drain 1 RLQ Bulb (Active)       Urethral Catheter Latex 20 Fr. (Active)       Findings: Uncomplicated prostatectomy, right nerve sparing, left wide dissection. Bilateral PLND. 18 Fr wayne 15 cc balloon.         Complications: None          Deondre Ocasio MD     Date: 12/19/2022  Time: 12:06 EST

## 2022-12-19 NOTE — PLAN OF CARE
Goal Outcome Evaluation:         VSMARITO RAY. Received from PACU following prostatectomy. F/c intact , patent, and draining. GARRISON drain patent and to bulb suction. Pain meds adequately controlling pain. Will continue to monitor
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
## 2022-12-19 NOTE — ANESTHESIA PROCEDURE NOTES
Airway  Urgency: elective    Date/Time: 12/19/2022 7:34 AM  Airway not difficult    General Information and Staff    Patient location during procedure: OR  CRNA/CAA: Meet Cheema CRNA    Indications and Patient Condition  Indications for airway management: airway protection    Preoxygenated: yes  MILS not maintained throughout  Mask difficulty assessment: 1 - vent by mask    Final Airway Details  Final airway type: endotracheal airway      Successful airway: ETT  Cuffed: yes   Successful intubation technique: direct laryngoscopy  Endotracheal tube insertion site: oral  Blade: Toan  Blade size: 3  ETT size (mm): 7.5  Cormack-Lehane Classification: grade I - full view of glottis  Placement verified by: chest auscultation and capnometry   Cuff volume (mL): 6  Measured from: lips  ETT/EBT  to lips (cm): 22  Number of attempts at approach: 1  Assessment: lips, teeth, and gum same as pre-op and atraumatic intubation    Additional Comments  Negative epigastric sounds, Breath sound equal bilaterally with symmetric chest rise and fall

## 2022-12-20 VITALS
DIASTOLIC BLOOD PRESSURE: 70 MMHG | SYSTOLIC BLOOD PRESSURE: 146 MMHG | WEIGHT: 171 LBS | OXYGEN SATURATION: 98 % | HEIGHT: 71 IN | RESPIRATION RATE: 18 BRPM | BODY MASS INDEX: 23.94 KG/M2 | HEART RATE: 76 BPM | TEMPERATURE: 97.6 F

## 2022-12-20 LAB
ANION GAP SERPL CALCULATED.3IONS-SCNC: 6 MMOL/L (ref 5–15)
BUN SERPL-MCNC: 10 MG/DL (ref 8–23)
BUN/CREAT SERPL: 9.5 (ref 7–25)
CALCIUM SPEC-SCNC: 8.4 MG/DL (ref 8.6–10.5)
CHLORIDE SERPL-SCNC: 106 MMOL/L (ref 98–107)
CO2 SERPL-SCNC: 28 MMOL/L (ref 22–29)
CREAT SERPL-MCNC: 1.05 MG/DL (ref 0.76–1.27)
CYTO UR: NORMAL
DEPRECATED RDW RBC AUTO: 43.4 FL (ref 37–54)
EGFRCR SERPLBLD CKD-EPI 2021: 76.8 ML/MIN/1.73
ERYTHROCYTE [DISTWIDTH] IN BLOOD BY AUTOMATED COUNT: 13.4 % (ref 12.3–15.4)
GLUCOSE SERPL-MCNC: 96 MG/DL (ref 65–99)
HCT VFR BLD AUTO: 36.7 % (ref 37.5–51)
HGB BLD-MCNC: 12.3 G/DL (ref 13–17.7)
LAB AP CASE REPORT: NORMAL
LAB AP CLINICAL INFORMATION: NORMAL
MCH RBC QN AUTO: 29.6 PG (ref 26.6–33)
MCHC RBC AUTO-ENTMCNC: 33.5 G/DL (ref 31.5–35.7)
MCV RBC AUTO: 88.2 FL (ref 79–97)
PATH REPORT.FINAL DX SPEC: NORMAL
PATH REPORT.GROSS SPEC: NORMAL
PLATELET # BLD AUTO: 163 10*3/MM3 (ref 140–450)
PMV BLD AUTO: 10.9 FL (ref 6–12)
POTASSIUM SERPL-SCNC: 3.6 MMOL/L (ref 3.5–5.2)
RBC # BLD AUTO: 4.16 10*6/MM3 (ref 4.14–5.8)
SODIUM SERPL-SCNC: 140 MMOL/L (ref 136–145)
WBC NRBC COR # BLD: 8.58 10*3/MM3 (ref 3.4–10.8)

## 2022-12-20 PROCEDURE — 85027 COMPLETE CBC AUTOMATED: CPT | Performed by: STUDENT IN AN ORGANIZED HEALTH CARE EDUCATION/TRAINING PROGRAM

## 2022-12-20 PROCEDURE — 63710000001 GABAPENTIN 100 MG CAPSULE: Performed by: STUDENT IN AN ORGANIZED HEALTH CARE EDUCATION/TRAINING PROGRAM

## 2022-12-20 PROCEDURE — 25010000002 ENOXAPARIN PER 10 MG: Performed by: STUDENT IN AN ORGANIZED HEALTH CARE EDUCATION/TRAINING PROGRAM

## 2022-12-20 PROCEDURE — A9270 NON-COVERED ITEM OR SERVICE: HCPCS | Performed by: STUDENT IN AN ORGANIZED HEALTH CARE EDUCATION/TRAINING PROGRAM

## 2022-12-20 PROCEDURE — 63710000001 OXYBUTYNIN XL 5 MG TABLET SUSTAINED-RELEASE 24 HOUR: Performed by: STUDENT IN AN ORGANIZED HEALTH CARE EDUCATION/TRAINING PROGRAM

## 2022-12-20 PROCEDURE — 63710000001 ACETAMINOPHEN 325 MG TABLET: Performed by: STUDENT IN AN ORGANIZED HEALTH CARE EDUCATION/TRAINING PROGRAM

## 2022-12-20 PROCEDURE — 25010000002 CEFAZOLIN IN DEXTROSE 2-4 GM/100ML-% SOLUTION: Performed by: STUDENT IN AN ORGANIZED HEALTH CARE EDUCATION/TRAINING PROGRAM

## 2022-12-20 PROCEDURE — 63710000001 POLYETHYLENE GLYCOL 17 G PACK: Performed by: STUDENT IN AN ORGANIZED HEALTH CARE EDUCATION/TRAINING PROGRAM

## 2022-12-20 PROCEDURE — 80048 BASIC METABOLIC PNL TOTAL CA: CPT | Performed by: STUDENT IN AN ORGANIZED HEALTH CARE EDUCATION/TRAINING PROGRAM

## 2022-12-20 PROCEDURE — 99024 POSTOP FOLLOW-UP VISIT: CPT | Performed by: NURSE PRACTITIONER

## 2022-12-20 PROCEDURE — 63710000001 SENNOSIDES-DOCUSATE 8.6-50 MG TABLET: Performed by: STUDENT IN AN ORGANIZED HEALTH CARE EDUCATION/TRAINING PROGRAM

## 2022-12-20 PROCEDURE — 63710000001 DONEPEZIL 5 MG TABLET: Performed by: STUDENT IN AN ORGANIZED HEALTH CARE EDUCATION/TRAINING PROGRAM

## 2022-12-20 PROCEDURE — 63710000001 OXYCODONE 5 MG TABLET: Performed by: STUDENT IN AN ORGANIZED HEALTH CARE EDUCATION/TRAINING PROGRAM

## 2022-12-20 PROCEDURE — G0378 HOSPITAL OBSERVATION PER HR: HCPCS

## 2022-12-20 RX ORDER — NITROFURANTOIN 25; 75 MG/1; MG/1
100 CAPSULE ORAL 2 TIMES DAILY
Qty: 4 CAPSULE | Refills: 0 | Status: SHIPPED | OUTPATIENT
Start: 2022-12-20 | End: 2022-12-22

## 2022-12-20 RX ORDER — SENNA PLUS 8.6 MG/1
1 TABLET ORAL 2 TIMES DAILY
Qty: 20 TABLET | Refills: 0 | Status: SHIPPED | OUTPATIENT
Start: 2022-12-20 | End: 2022-12-27

## 2022-12-20 RX ORDER — BACITRACIN ZINC AND POLYMYXIN B SULFATE 500; 1000 [USP'U]/G; [USP'U]/G
OINTMENT TOPICAL
Qty: 30 G | Refills: 0 | Status: SHIPPED | OUTPATIENT
Start: 2022-12-20 | End: 2022-12-27

## 2022-12-20 RX ORDER — POLYETHYLENE GLYCOL 3350 17 G/17G
17 POWDER, FOR SOLUTION ORAL DAILY
Qty: 12 PACKET | Refills: 0 | Status: SHIPPED | OUTPATIENT
Start: 2022-12-20 | End: 2022-12-27

## 2022-12-20 RX ORDER — HYDROCODONE BITARTRATE AND ACETAMINOPHEN 5; 325 MG/1; MG/1
1 TABLET ORAL EVERY 6 HOURS PRN
Qty: 15 TABLET | Refills: 0 | Status: SHIPPED | OUTPATIENT
Start: 2022-12-20 | End: 2022-12-27

## 2022-12-20 RX ORDER — OXYBUTYNIN CHLORIDE 5 MG/1
5 TABLET, EXTENDED RELEASE ORAL DAILY
Qty: 10 TABLET | Refills: 0 | Status: SHIPPED | OUTPATIENT
Start: 2022-12-20 | End: 2022-12-27

## 2022-12-20 RX ADMIN — OXYCODONE 5 MG: 5 TABLET ORAL at 03:25

## 2022-12-20 RX ADMIN — ACETAMINOPHEN 650 MG: 325 TABLET ORAL at 15:10

## 2022-12-20 RX ADMIN — OXYBUTYNIN CHLORIDE 5 MG: 5 TABLET, EXTENDED RELEASE ORAL at 08:43

## 2022-12-20 RX ADMIN — CEFAZOLIN SODIUM 2 G: 2 INJECTION, SOLUTION INTRAVENOUS at 08:42

## 2022-12-20 RX ADMIN — SENNOSIDES AND DOCUSATE SODIUM 1 TABLET: 50; 8.6 TABLET ORAL at 08:43

## 2022-12-20 RX ADMIN — ENOXAPARIN SODIUM 40 MG: 40 INJECTION SUBCUTANEOUS at 08:43

## 2022-12-20 RX ADMIN — OXYCODONE 5 MG: 5 TABLET ORAL at 15:10

## 2022-12-20 RX ADMIN — GABAPENTIN 100 MG: 100 CAPSULE ORAL at 08:43

## 2022-12-20 RX ADMIN — DONEPEZIL HYDROCHLORIDE 10 MG: 5 TABLET ORAL at 08:43

## 2022-12-20 RX ADMIN — BACITRACIN 1 APPLICATION: 500 OINTMENT TOPICAL at 15:10

## 2022-12-20 RX ADMIN — POLYETHYLENE GLYCOL 3350 17 G: 17 POWDER, FOR SOLUTION ORAL at 08:43

## 2022-12-20 RX ADMIN — ACETAMINOPHEN 650 MG: 325 TABLET ORAL at 08:43

## 2022-12-20 NOTE — ADDENDUM NOTE
Addendum  created 12/20/22 1000 by Nayan Vivar CRNA    Clinical Note Signed, Diagnosis association updated, Intraprocedure Blocks edited

## 2022-12-20 NOTE — PROGRESS NOTES
I have reviewed the results of the patient's prostate pathology after radical prostatectomy.  This demonstrates focal extraprostatic extension at the left anterior and left peripheral prostate [id number], with negative margins which is great news.  His lymph nodes were negative for malignancy.  I will discuss this further and provide the patient a copy of his pathology report at his follow-up 12/27/2022 for catheter removal.

## 2022-12-20 NOTE — PLAN OF CARE
Problem: Adult Inpatient Plan of Care  Goal: Plan of Care Review  Outcome: Ongoing, Progressing  Flowsheets (Taken 12/20/2022 0453)  Progress: improving  Plan of Care Reviewed With: patient  Outcome Evaluation: VSS. Ambulated in ash, assist x2. Gusman & GARRISON in place. Garrison dsg with shadowing, scant. No complaints noted. Will cont to monitor.  Goal: Patient-Specific Goal (Individualized)  Outcome: Ongoing, Progressing  Goal: Absence of Hospital-Acquired Illness or Injury  Outcome: Ongoing, Progressing  Intervention: Identify and Manage Fall Risk  Recent Flowsheet Documentation  Taken 12/20/2022 0400 by Mckenna Rock RN  Safety Promotion/Fall Prevention:   activity supervised   assistive device/personal items within reach   safety round/check completed  Taken 12/20/2022 0200 by Mckenna Rock RN  Safety Promotion/Fall Prevention:   assistive device/personal items within reach   activity supervised  Taken 12/20/2022 0000 by Mckenna Rock RN  Safety Promotion/Fall Prevention:   activity supervised   assistive device/personal items within reach  Taken 12/19/2022 2000 by Mckenna Rock RN  Safety Promotion/Fall Prevention:   activity supervised   assistive device/personal items within reach   safety round/check completed  Intervention: Prevent Skin Injury  Recent Flowsheet Documentation  Taken 12/20/2022 0400 by Mckenna Rock RN  Body Position: position changed independently  Taken 12/20/2022 0200 by Mckenna Rock RN  Body Position: position changed independently  Taken 12/20/2022 0000 by Mckenna Rock RN  Body Position: position changed independently  Taken 12/19/2022 2000 by Mckenna Rock RN  Body Position: position changed independently  Intervention: Prevent and Manage VTE (Venous Thromboembolism) Risk  Recent Flowsheet Documentation  Taken 12/20/2022 0400 by Mckenna Rock RN  Activity Management: activity adjusted per tolerance  Taken 12/20/2022 0230 by Mckenna Rock RN  Activity Management:  ambulated outside room  Taken 12/20/2022 0200 by Mckenna Rock RN  Activity Management: activity adjusted per tolerance  Taken 12/20/2022 0000 by Mckenna Rock RN  Activity Management: activity adjusted per tolerance  Taken 12/19/2022 2000 by Mckenna Rock RN  Activity Management: activity adjusted per tolerance  Goal: Optimal Comfort and Wellbeing  Outcome: Ongoing, Progressing  Goal: Readiness for Transition of Care  Outcome: Ongoing, Progressing     Problem: Fall Injury Risk  Goal: Absence of Fall and Fall-Related Injury  Outcome: Ongoing, Progressing  Intervention: Promote Injury-Free Environment  Recent Flowsheet Documentation  Taken 12/20/2022 0400 by Mckenna Rock RN  Safety Promotion/Fall Prevention:   activity supervised   assistive device/personal items within reach   safety round/check completed  Taken 12/20/2022 0200 by Mckenna Rock RN  Safety Promotion/Fall Prevention:   assistive device/personal items within reach   activity supervised  Taken 12/20/2022 0000 by Mckenna Rock RN  Safety Promotion/Fall Prevention:   activity supervised   assistive device/personal items within reach  Taken 12/19/2022 2000 by Mckenna Rock RN  Safety Promotion/Fall Prevention:   activity supervised   assistive device/personal items within reach   safety round/check completed     Problem: Adjustment to Surgery (Radical Prostatectomy)  Goal: Optimal Coping  Outcome: Ongoing, Progressing     Problem: Bleeding (Radical Prostatectomy)  Goal: Absence of Bleeding  Outcome: Ongoing, Progressing     Problem: Bowel Elimination Impaired (Radical Prostatectomy)  Goal: Effective Bowel Elimination  Outcome: Ongoing, Progressing     Problem: Fluid and Electrolyte Imbalance (Radical Prostatectomy)  Goal: Fluid and Electrolyte Balance  Outcome: Ongoing, Progressing     Problem: Infection (Radical Prostatectomy)  Goal: Absence of Infection Signs and Symptoms  Outcome: Ongoing, Progressing     Problem: Ongoing  Anesthesia Effects (Radical Prostatectomy)  Goal: Anesthesia/Sedation Recovery  Outcome: Ongoing, Progressing  Intervention: Optimize Anesthesia Recovery  Recent Flowsheet Documentation  Taken 12/20/2022 0400 by Mckenna Rock RN  Safety Promotion/Fall Prevention:   activity supervised   assistive device/personal items within reach   safety round/check completed  Taken 12/20/2022 0200 by Mckenna Rock RN  Safety Promotion/Fall Prevention:   assistive device/personal items within reach   activity supervised  Taken 12/20/2022 0000 by Mckenna Rock RN  Safety Promotion/Fall Prevention:   activity supervised   assistive device/personal items within reach  Taken 12/19/2022 2000 by Mckenna Rock RN  Safety Promotion/Fall Prevention:   activity supervised   assistive device/personal items within reach   safety round/check completed     Problem: Pain (Radical Prostatectomy)  Goal: Acceptable Pain Control  Outcome: Ongoing, Progressing     Problem: Postoperative Nausea and Vomiting (Radical Prostatectomy)  Goal: Nausea and Vomiting Symptom Relief  Outcome: Ongoing, Progressing     Problem: Respiratory Compromise (Radical Prostatectomy)  Goal: Effective Oxygenation and Ventilation  Outcome: Ongoing, Progressing  Intervention: Optimize Oxygenation and Ventilation  Recent Flowsheet Documentation  Taken 12/20/2022 0400 by Mckenna Rock RN  Head of Bed (HOB) Positioning: HOB elevated  Taken 12/20/2022 0200 by Mckenna Rock RN  Head of Bed (HOB) Positioning: HOB elevated  Taken 12/20/2022 0000 by Mckenna Rock RN  Head of Bed (HOB) Positioning: HOB elevated  Taken 12/19/2022 2000 by Mckenna Rock RN  Head of Bed (HOB) Positioning: HOB elevated     Problem: Urinary Elimination Impaired (Radical Prostatectomy)  Goal: Effective Urinary Elimination  Outcome: Ongoing, Progressing   Goal Outcome Evaluation:  Plan of Care Reviewed With: patient        Progress: improving  Outcome Evaluation: VSS. Ambulated in ash,  assist x2. Gusman & GARRISON in place. Garrison dsg with shadowing, scant. No complaints noted. Will cont to monitor.

## 2022-12-20 NOTE — PLAN OF CARE
Goal Outcome Evaluation:            VSS, RA, SR, patient and spouse viewed wayne care video. Catheter placed to leg bag. Met criteria for discharge

## 2022-12-20 NOTE — OP NOTE
PROSTATECTOMY LAPAROSCOPIC WITH DAVINCI ROBOT  Procedure Report    Patient Name:  Fransico James  YOB: 1955    Date of Surgery:  12/19/2022     Indications: The patient is a 67-year-old male who was found to have favorable intermediate risk prostate cancer, after discussion of risks, benefits and alternatives the patient elected to proceed with robotic prostatectomy today.  Informed consent was reviewed and signed.    Pre-op Diagnosis:   Prostate cancer (HCC) [C61]       Post-Op Diagnosis Codes:     * Prostate cancer (HCC) [C61]         Procedure(s):  1. ROBOTIC ASSISTED LAPAROSCOPIC PROSTATECTOMY (RIGHT NERVE SPARING)   2. BILATERAL PELVIC LYMPH NODE DISSECTION      Staff:  Surgeon(s):  Deondre Ocasio MD Bhalodi, Amul, MD     MODIFIER 80  Dr. Pineda served as my surgical assistant for this case.  His expertise in obtaining abdominal access, port placement, retraction, suctioning, suturing, skin closure, specimen extraction were necessary for the safe completion of this procedure.      Anesthesia: General    Estimated Blood Loss: 75 mL    Implants:    Implant Name Type Inv. Item Serial No.  Lot No. LRB No. Used Action   CLIP LIG HEMOLOK PA LG 6CT PRP - [id number] Implant CLIP LIG HEMOLOK PA LG 6CT PRP  [employer] [id number] N/A 2 Implanted   CLIP LIG HEMOLOK PA LG 6CT PRP - [id number] Implant CLIP LIG HEMOLOK PA LG 6CT PRP  Intent [employer]  N/A 1 Implanted   DEV CONTRL TISS STRATAFIX SPIRAL PDS PLS SH 3/0 6IN 15CM KEATON - [id number] Implant DEV CONTRL TISS STRATAFIX SPIRAL PDS PLS SH 3/0 6IN 15CM KEATON  [employer]  DIV OF J AND J [id number] N/A 2 Implanted   RELOAD ECHELON ENDOPATH GST 45MM CHARLES - [id number] Implant RELOAD ECHELON ENDOPATH GST 45MM CHARLES  ETHICON  DIV OF J AND J [id number] N/A 1 Implanted       Specimen:              Drains: 20 Fr wayne catheter      Findings:   1. Uncomplicated robotic prostatectomy RIGHT nerve sparing, LEFT wide dissection   2. Bilateral pelvic  lymphadenectomy   3. Water tight urethrovesical anastomosis  4. LLQ GARRISON drain     Complications: None    Description of Procedure:     The patient was seen and identified in the pre-operative area. Informed consent was verified after repeat discussion regarding risks and benefits of operation (detailed in pre-op H+P).  He was seen taken to the operating room per anesthesia and laid in supine position on the operating table. SCDs were placed in bilateral lower extremities and were cycling. Pre operative IV Ancef was administered. The patient was intubated and general anesthesia was introduced. Once adequate anesthesia was obtained, he was placed in a low lithotomy position. A brief time-out was performed confirming correct patient, procedure, and laterality. The abdomen and genitals were prepped and draped in sterile fashion.    An 18 Fr wayne catheter was placed in sterile fashion.     The 11 blade scalpel was used to make a stab incision above the umbilicus. The Veress needle was used to gain access above the umbilicus. Drop test confirmed placement and access was obtained without difficulty.  Pneumoperitoneum was increased to 15 mmHg.  The supraumbilical incision was extended and non-bladed 8mm robotic visual camera port was then placed under direct vision. 3 robotic ports and two assistant ports, one 5 mm port in the right upper quadrant and one 12 mm assistant port at the right anterior abdomen were placed under direct vision without difficulty. The 12 mm assistant port was temporarily removed and the iovation device was used to pre-place a closure suture of 0-Vicryl at the assistant port. The 12 mm port was readvanced. The patient was then placed in steep Trendelenburg position. The abdomen was inspected. The robot was then docked.     Left sigmoid colon pelvic adhesions were taken down sharply to gain adequate exposure.      I began my initial approach which was a posterior dissection, identifying the  pouch of Michael and incising with monopolar scissors 2 cm above the peritoneal reflection above the perirectal fat, we extended this incision for roughly 2 cm bilaterally and identified the seminal vesicles and vas deferens posterior to the prostate.  The left and right vas deferens were skeletonized out laterally and then divided after controlling the basal artery and vessels with the bipolar device.  The seminal vesicle adventitia was divided with blunt and cautery dissection, and the tip of the seminal vesicles bilaterally were identified, the bipolar device was used to cauterize the vessels at the base of the seminal vesicles, making sure to stay right on top of the seminal vesicle and avoid going laterally which would potentially injure the neurovascular bundles.    Next, the transected vas deferens and intact skeletonized seminal vesicles were lifted up with the aid of the fourth arm, I then carried my dissection distally, staying right underneath the prostate and bluntly  off the Denonvilliers' fascia beneath.  I begin my nerve sparing dissection on the patient's RIGHT side, sweeping off the neurovascular bundle tissue at the posterior and lateral aspects of the prostate, keeping Denonvilliers' fascia down.  I continued this is far laterally as was possible.  On the patient's LEFT side, I performed wide dissection.     Once this was completed, I backed the camera up and begin my standard template bilateral pelvic lymph node dissection, starting on the patient's right side.  The boundaries of the template included the external iliac veins distally to the inguinal ligament and node of [place] and lateral towards the pelvic sidewall.  Hemolock clips were placed distal to the node of [place] to prevent lymphocele.  The obturator nerve and vessels were identified and preserved, hemolock clip was placed at the junction of the obturator nodes deep to the external iliac vein to prevent lymphocele. No  "obturator nerve injury was obvious.  The right pelvic lymph node packet was removed via the assistant port. The same procedure was performed on the patient's left side.    I then identified the vas deferens and removed a piece medial to the iliac vessels. This was performed on both sides. Next, the urachus and medial umbilical ligaments were divided after coagulation with the bipolar Maryland, and the space of Retzius was entered. The bladder was dropped and intraperitonealized. We then carried down our lateral incisions along the medial umbilical ligaments bilaterally to the previous site where the segment of vas deferens had been ligated and removed.  The superficial dorsal vein was controlled with the bipolar Maryland and transected, the prostate was then defatted and this fat was removed and sent as a separate specimen, \"periprostatic fat.\"      The endopelvic fascia was identified and cleared of surrounding attachments. I then opened up the endopelvic fascia sharply on both sides and swept away the levator muscles. This was carried out distally toward the apex of the prostate, identifying and avoiding the urethral sphincter muscle fibers. The junction of the dorsal venous complex and urethra was identified. The puboprostatic ligaments were then divided with cautery, and the dorsal venous complex was isolated and ligated using a vascular stapler via the aid of the assistant.    I then used the monopolar scissor to begin division at the junction of the prostate and bladder neck, longitudinal bladder muscle fibers were confirmed throughout this dissection with avoidance of inadvertently dissecting through prostate tissue.  I eventually dissected down through the bladder neck and exposed the Gusman catheter which was visualized and brought up to the anterior abdominal wall and suspended to anterior abdominal wall with the aid of the 4th robotic arm.  The posterior bladder neck was transected with cautery, avoiding " "\"button-holing\" of the bladder. This plane was dissected until the previously skeletonized vas deferens and seminal vesicles were identified.  The vas deferens and seminal vesicles were then grabbed with the fourth arm and lifted up. The lateral prostatic pedicles were skeletonized with blunt and light cautery dissection.    Next I performed a high anterior release of the prostatic fascia on the patient's RIGHT side with sharp dissection, down through the periprostatic veins, exposing the shiny white prostate capsular tissue beneath.  This nerve sparing plane was continued with blunt and sharp dissection until the previously created nerve sparing plane performed initially via posterior approach was identified, and these two nerve sparing planes were then .    I then used the Maryland to bluntly create a space along the prostatic vascular pedicle staying just off the prostate. The pedicle was then controlled with hemolock clips along its course, and then divided sharply.     On the patient's LEFT side, a wide dissection was performed, with skeletonization of the prostatic pedicle performed and using hemolock clips just off of the prostate to march down the pedicle of the prostate keeping the neurovascular bundle and prostatic fascia on the prostate specimen.  The rectum was retracted to the contralateral side via the assistant during this dissection to avoid rectal injury.    Next, I continued to dissect distally towards the apex of the prostate avoiding injury to the neurovascular bundle on the RIGHT side.  The rectourethralis attachments were divided along this dissection course.  The urethra proper was identified and cleared laterally and posteriorly with blunt and sharp dissection.  Next I used the monopolar scissors to dissect the staple neurovascular bundle off of the prostate and continued this dissection until the urethra proper was identified distally.      Next, the anterior urethra was transected " with sharp dissection until the catheter was visualized. The catheter was then pulled back and the posterior urethra was transected fully.  The final rectourethralis attachments along the posterior urethra plate were transected.  This completely freed up the specimen containing prostate and seminal vesicles. It was placed in an EndoCatch bag. The pelvis was then irrigated out. No injuries were noted. Good hemostasis was visualized.    The vesicourethral anastomosis was then begun.     Our anastomotic stitch, two separate 3-0 barbed stratafix sutures previously looped to one another, was then brought in, both needles were advanced through the posterior bladder neck and then to the urethra at the 5:30 oclock and 6:30 oclock positions.  The anastomosis was then performed in a running fashion in both a clockwise and counterclockwise fashion, after gently maneuvering the bladder towards the urethra and tightening the sutures as we went.  Our final catheter was placed, a 20 [place] Gusman catheter with 15 mL of sterile water placed into the balloon.  The anastomosis was tested and proved to be watertight.  The Endo Catch bag was then grasped from the supraumbilical port for extraction.  Next, a Ayush-Fry drain was brought in through the right robotic arm port and maneuvered to the distal pelvis.  The drain was secured to the skin with a 2-0 silk suture.    The robot was then undocked.  The supraumbilical incision was extended with the Bovie and the fascia extended over a finger avoiding bowel injury. The specimen in the Endo Catch bag was removed through this extraction incision above the umbilicus.  The fascia was closed with multiple 0-Vicryl sutures in a figure-of-eight fashion. The rest of the ports were irrigated out and the skin was closed with 4-0 Monocryl in a subcuticular fashion. Dermabond was then applied. The patient tolerated the procedure well. He was extubated and transferred to PACU in stable  condition.    DISPOSITION:   The patient will be admitted to [facility] floor for overnight observation with possible discharge in the morning pending tolerance of diet, pain control and ambulation.  GARRISON drain will be removed prior to discharge pending appropriate output.  Gusman catheter will remain in place for 7 days and the patient will follow back up with me in 1 week for catheter removal and trial of void.          Deondre Ocasio MD     Date: 12/19/2022  Time: 19:06 EST

## 2022-12-20 NOTE — CASE MANAGEMENT/SOCIAL WORK
Discharge Planning Assessment  [facility]     Patient Name: Fransico James  MRN: [id number]  Today's Date: 12/20/2022    Admit Date: 12/19/2022    Plan: home   Discharge Needs Assessment     Row Name 12/20/22 1039       Living Environment    People in Home spouse    Current Living Arrangements home    Primary Care Provided by self       Transition Planning    Patient/Family Anticipates Transition to home    Transportation Anticipated family or friend will provide       Discharge Needs Assessment    Readmission Within the Last 30 Days no previous admission in last 30 days    Equipment Currently Used at Home none    Concerns to be Addressed denies needs/concerns at this time;no discharge needs identified               Discharge Plan     Row Name 12/20/22 1039       Plan    Plan home    Plan Comments I met with Mr. James at the bedside. He lives in [facility] with his wife. He ambulates independently and is independent with all activities of daily living. He drives himself when he leaves his home. He is admitted for a scheduled surgery. He anticipates hospital discharge today. He denies having any discharge needs.    Final Discharge Disposition Code 01 - home or self-care              Continued Care and Services - Admitted Since 12/19/2022    Coordination has not been started for this encounter.       Expected Discharge Date and Time     Expected Discharge Date Expected Discharge Time    Dec 21, 2022          Demographic Summary     Row Name 12/20/22 1039       General Information    General Information Comments I confirmed that Víctor Gleason is Mr. James's PCP. [employer] is his insurer.               Functional Status     Row Name 12/20/22 1039       Functional Status, IADL    Medications independent               Psychosocial    No documentation.                Abuse/Neglect    No documentation.                Legal    No documentation.                Substance Abuse    No documentation.                Patient  Forms    No documentation.                   Calvin Stack RN

## 2022-12-20 NOTE — PROGRESS NOTES
[facility]   Urology Progress Note    Patient Name: Fransico James  : 1955  MRN: [id number]  Primary Care Physician:  Víctor Gleason MD  Date of admission: 2022    Subjective   Subjective     Chief Complaint: Prostatectomy    HPI:  Patient resting in bed. Ambulated x1 overnight. Pain controlled, passing flatus. He is tolerating diet without nausea/emesis. Gusman catheter draining yellow urine output.       Objective   Objective     Vitals:   Temp:  [97.3 °F (36.3 °C)-98.4 °F (36.9 °C)] 98.3 °F (36.8 °C)  Heart Rate:  [65-82] 74  Resp:  [14-18] 18  BP: (115-142)/(64-78) 132/68  Flow (L/min):  [2-5] 2  Physical Exam    Constitutional: Awake in bed, alert   Eyes: PERRLA, sclerae anicteric, no conjunctival injection   HENT: Normocephalic, atraumatic, mucous membranes moist   Neck: Supple, trachea midline   Respiratory: Equal chest rise, non-labored respirations    Cardiovascular: RRR, palpable radial pulses bilaterally   Gastrointestinal: Soft, nondistended. Incision sites clean,dry, intact. GARRISON drain with serosanguineous output.    Genitourinary: Gusman catheter draining yellow urine output   Musculoskeletal: No lower extremity edema bilaterally, no clubbing or cyanosis to extremities   Psychiatric: Appropriate affect, cooperative   Neurologic: Oriented x 3,  Cranial Nerves grossly intact, speech clear   Skin: No rashes     Result Review    Result Review:  I have personally reviewed the results from the time of this admission to 2022 08:53 EST and agree with these findings:  []  Laboratory  []  Microbiology  []  Radiology  []  EKG/Telemetry   []  Cardiology/Vascular   []  Pathology  []  Old records  []  Other: Vitals, output.        Assessment & Plan   Assessment / Plan     Brief Patient Summary:  Fransico James is a 67 y.o. male who is POD 1 s/p Robotic-assisted laparoscopic prostatectomy with BPLND for prostate cancer with Dr. Ocasio and Dr. Pineda. He is feeling well.     Active Hospital  Problems:  Active Hospital Problems    Diagnosis    • **Prostate cancer (HCC)        Plan:   -AM Labs pending  -OOB/Ambulate  -Incentive spirometer  -Keep wayne catheter and GARRISON drain in place  -Tentative plan to discharge home this afternoon if doing well  D/w Dr. Ocasio       DVT prophylaxis:  Medical and mechanical DVT prophylaxis orders are present.    CODE STATUS:   Level Of Support Discussed With: Patient  Code Status (Patient has no pulse and is not breathing): CPR (Attempt to Resuscitate)  Medical Interventions (Patient has pulse or is breathing): Full Support  Release to patient: Routine Release    Disposition:  I expect patient to discharge home afternoon of 12/20.    Electronically signed by COLE Robertson, 12/20/22, 8:53 AM EST.

## 2022-12-20 NOTE — DISCHARGE SUMMARY
Date of Discharge:  12/20/2022    Discharge Diagnosis:   Prostate Cancer    Problem List:  Active Hospital Problems    Diagnosis  POA   • **Prostate cancer (HCC) [C61]  Yes      Resolved Hospital Problems   No resolved problems to display.       Presenting Problem/History of Present Illness  Prostate cancer (HCC) [C61]      Hospital Course  Patient is a 67 y.o. male who is POD 1 s/p Robotic-assisted laparoscopic prostatectomy with bilateral pelvic lymph node dissection for favorable intermediate risk prostate cancer with Dr. Ocasio and Dr. Pineda. He has recovered well. Post-operatively his labs and vital signs have been stable. He is tolerating diet and passing flatus. His pain has been controlled and he is ambulating. He has had good urine output to wayne catheter.    Ok to discharge home this afternoon with wayne catheter in place. Nursing to provide leg bag, teach wayne care prior to discharge. I removed patients GARRISON drain at the bedside, and covered site with gauze. Provided extra gauze for discharge. Follow up on 12/27/22 with Dr. Ocasio for wayne catheter removal. Post-op medications sent to patients pharmacy and discussed with patient. Patient and patients wife are understanding and agreeable with plan.     Procedures Performed    Procedure(s):  ROBOTIC ASSISTED LAPAROSCOPIC PROSTATECTOMY, BILATERAL PELVIC LYMPH NODE DISSECTION, RIGHT NERVE SPARING  -------------------       Consults:   Consults     No orders found for last 30 day(s).          Condition on Discharge:  Stable, alert, oriented, pain controlled.     Vital Signs  Temp:  [97.5 °F (36.4 °C)-98.4 °F (36.9 °C)] 97.6 °F (36.4 °C)  Heart Rate:  [74-76] 76  Resp:  [18] 18  BP: (129-146)/(68-78) 146/70    Discharge Disposition  Home or Self Care    Discharge Medications     Discharge Medications      New Medications      Instructions Start Date   bacitracin-polymyxin b [id number] UNIT/GM ointment  Commonly known as: POLYSPORIN   Apply twice daily  to meatus/tip of wayne catheter      HYDROcodone-acetaminophen 5-325 MG per tablet  Commonly known as: Norco   1 tablet, Oral, Every 6 Hours PRN      nitrofurantoin (macrocrystal-monohydrate) 100 MG capsule  Commonly known as: Macrobid   100 mg, Oral, 2 Times Daily      oxybutynin XL 5 MG 24 hr tablet  Commonly known as: DITROPAN-XL   5 mg, Oral, Daily      polyethylene glycol 17 g packet  Commonly known as: MIRALAX   17 g, Oral, Daily      senna 8.6 MG tablet  Commonly known as: Senokot   1 tablet, Oral, 2 Times Daily         Continue These Medications      Instructions Start Date   donepezil 10 MG tablet  Commonly known as: ARICEPT   10 mg, Oral, Daily      multivitamin with minerals tablet tablet   1 tablet, Oral, Daily             Discharge Diet   Diet Instructions     Advance Diet As Tolerated            Activity at Discharge  Activity Instructions     Bathing Restrictions      Type of Restriction: Bathing    Bathing Restrictions: No Tub Bath    Driving Restrictions      Type of Restriction: Driving    Driving Restrictions: No Driving While Taking Narcotics    Gradually Increase Activity Until at Pre-Hospitalization Level      Lifting Restrictions      Type of Restriction: Lifting    Lifting Restrictions: Lifting Restriction (Indicate Limit)    Weight Limit (Pounds): 25    Length of Lifting Restriction: x4-6 weeks          Follow-up Appointments  No future appointments.  Additional Instructions for the Follow-ups that You Need to Schedule     Call MD With Problems / Concerns   As directed      If develop fever/chills, uncontrolled pain, decreased urine output or bloody urine output.    Order Comments: If develop fever/chills, uncontrolled pain, decreased urine output or bloody urine output.          Discharge Follow-up with Specified Provider: Dr. Ocasio   As directed      To: Dr. Ocasio    Follow Up Details: 12/27/22               Test Results Pending at Discharge      COLE Robertson    Time:  Discharge 60 minutes.

## 2022-12-22 ENCOUNTER — TELEPHONE (OUTPATIENT)
Dept: UROLOGY | Facility: CLINIC | Age: 67
End: 2022-12-22

## 2022-12-22 NOTE — TELEPHONE ENCOUNTER
Fely, patient's wife calling in stating patient was not given any instructions on how to clean the catheter bag.      Patient had surgery on 12/19/22:  Prostate cancer.    Apologized to Zaida for not receiving instructions on how to clean the catheter bag .    Informed Zaida, she just needs to drain the catheter bag, every time it is full does not need to clean it.     Fely insisted on wanting to clean the catheter bag, and she is worried that pt is going to get an infection after using the same bag for seven days.    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    Informed Zaida, that if she really wants to clean the catheter bag, she can do so with warm water and soap, then rinse it until its clean.    If Zaida is concern about pt getting an infection due to using the same cath bag for seven days, she can come to the office and we would gladly give her a couple of catheter bags, to use until pt comes in on 12/27/22.    Zaida, was not listening and insisted that she did not get instructions on how to clean the bag at time pt was discharged, and pt was going to get an infection due to using the same bag, and she was a nurse ....etc.    Again, informed Zaida she had two options, she can either clean the catheter bag with warm water and soap if she really wants to clean the bag.  Or she can come in to the office to get new clean catheter bags.    Patient was clearly frustrated and upset and was not accepting or listening to  the two options I was giving her.      Fely said she was going to contact a company in order to get catheter bags.

## 2022-12-27 ENCOUNTER — OFFICE VISIT (OUTPATIENT)
Dept: UROLOGY | Facility: CLINIC | Age: 67
End: 2022-12-27

## 2022-12-27 VITALS — BODY MASS INDEX: 23.85 KG/M2 | HEIGHT: 71 IN

## 2022-12-27 DIAGNOSIS — C61 PROSTATE CANCER: Primary | ICD-10-CM

## 2022-12-27 NOTE — PROGRESS NOTES
Follow Up Office Visit      Patient Name: Fransico James  : 1955   MRN: [id number]     Chief Complaint:    Chief Complaint   Patient presents with   • Prostate Cancer       Referring Provider: No ref. provider found    History of Present Illness: Fransico James is a 67 y.o. male who presents today for follow up of prostate cancer.  He has a history of grade group 2 prostate cancer.  He presented to the operating room 2022 for robotic assisted laparoscopic prostatectomy, right nerve sparing, left wide dissection, bilateral pelvic lymph node dissection.  He is doing well at home, reports having bowel movements and ambulating.  Pain is well controlled, he has weaned off pain meds.  Over the weekend, last Friday he presented to [facility] with ongoing penile discomfort despite using bacitracin ointment.  He was prescribed empiric ciprofloxacin which he continues to take.  He denies any purulence, erythema or drainage around the tip of the catheter.  He denies fevers at home.    Patient's catheter was removed today, trial of void performed by MA.  Patient was able to void 200 cc of sterile water that was instilled into his bladder.    We reviewed his surgical pathology today.  This demonstrates primarily grade group 3 disease, pT3a N0 MX, by virtue of a small area of extraprostatic extension with negative margins on the left side.     1. LYMPH NODE, RIGHT PELVIC, EXCISION:  Two (2) lymph node negative for metastatic carcinoma (0/2).  2.   LYMPH NODE, LEFT PELVIC, EXCISION:  Two (2) lymph node negative for metastatic carcinoma (0/2).  3.   PERIPROSTATIC FAT, EXCISION:  Unremarkable fibroadipose tissue; negative for carcinoma.  4.   PROSTATE, PROSTATECTOMY:  Prostatic acinar adenocarcinoma.  All margins are negative for carcinoma.  See synoptic report.     PROSTATE RADICAL/ENUCLEATION     TYPE OF SPECIMEN/PROCEDURE:  Radical prostatectomy  PROSTATE SIZE:               Prostate weight in  grams (g): 36.4 g               Prostate size in centimeters (cm): 3.5 x 3.5 x 3.0 cm  HISTOLOGIC TYPE: Acinar adenocarcinoma  HISTOLOGIC GRADE: Grade group 5 ([place] score 4+3 = 7), Minor Tertiary Pattern 5 (less than 5%)  Percentage of pattern 4: 60%  INTRADUCTAL CARCINOMA: Not identified  CRIBRIFORM GLANDS: Present  TREATMENT EFFECT: No known presurgical therapy  TUMOR QUANTIFICATION: Via percentage               Estimated percentage of prostate involved by tumor: 11-20%  EXTRAPROSTATIC EXTENSION: Present, focal               Location of extraprostatic extension: Left anterior and left posterior  URINARY BLADDER NECK INVASION: Not identified  SEMINAL VESICLE INVASION: Not identified  LYMPHOVASCULAR INVASION: Not identified  PERINEURAL INVASION: Present  MARGIN STATUS: All margins negative for invasive carcinoma  REGIONAL LYMPH NODE STATUS: Regional lymph nodes present: All regional lymph nodes negative for tumor  NUMBER OF LYMPH NODES EXAMINED: 4  DISTANT METASTASIS: Not applicable     AJCC PATHOLOGIC STAGE:  (COMPLETED BY PATHOLOGIST, BASED ONLY ON TISSUE FINDINGS, MORE EXTENSIVE DISEASE MAY NOT BE KNOWN TO THE PATHOLOGIST)  TNM Descriptors: Not applicable  pT=   pT3a  pN=   pN0  pM= not applicable  AJCC PATHOLOGIC STAGE: pT3a pN0    Subjective      Review of System: Review of Systems   Constitutional: Negative for fever.   Gastrointestinal: Negative for nausea and vomiting.   Genitourinary: Positive for penile pain. Negative for decreased urine volume, difficulty urinating, dysuria, enuresis, flank pain, frequency, hematuria and urgency.      I have reviewed the ROS documented by my clinical staff, I have updated appropriately and I agree. Deondre Ocasio MD    I have reviewed and the following portions of the patient's history were updated as appropriate: past family history, past medical history, past social history, past surgical history and problem list.    Medications:     Current Outpatient  "Medications:   •  donepezil (ARICEPT) 10 MG tablet, Take 10 mg by mouth Daily., Disp: , Rfl:   •  multivitamin with minerals tablet tablet, Take 1 tablet by mouth Daily., Disp: , Rfl:     Allergies:   Allergies   Allergen Reactions   • Penicillins Rash     Tolerates keflex and cephalosporins    • Sulfa Antibiotics Rash          Objective     Physical Exam:   Vital Signs:   Vitals:    12/27/22 1131   Height: 180.3 cm (71\")     Body mass index is 23.85 kg/m².     Physical Exam  Vitals and nursing note reviewed.   Constitutional:       Appearance: Normal appearance.   HENT:      Head: Normocephalic and atraumatic.      Mouth/Throat:      Mouth: Mucous membranes are moist.      Pharynx: Oropharynx is clear.   Eyes:      Extraocular Movements: Extraocular movements intact.      Conjunctiva/sclera: Conjunctivae normal.   Cardiovascular:      Rate and Rhythm: Normal rate and regular rhythm.   Pulmonary:      Effort: Pulmonary effort is normal. No respiratory distress.   Abdominal:      General: There is no distension.      Palpations: Abdomen is soft.      Tenderness: There is no abdominal tenderness. There is no right CVA tenderness or left CVA tenderness.      Hernia: No hernia is present.      Comments: Robotic port site incisions well approximated with skin glue, no erythema or hernia present.   Genitourinary:     Comments: Circumcised phallus, orthotopic meatus, bilaterally descended testicles without masses, or lesions. Gusman in place draining clear yellow urine.   Musculoskeletal:         General: Normal range of motion.      Cervical back: Normal range of motion.   Skin:     General: Skin is warm and dry.   Neurological:      General: No focal deficit present.      Mental Status: He is alert and oriented to person, place, and time.   Psychiatric:         Mood and Affect: Mood normal.         Behavior: Behavior normal.         Labs:   Brief Urine Lab Results     None               Lab Results   Component Value Date "    GLUCOSE 96 12/20/2022    CALCIUM 8.4 (L) 12/20/2022     12/20/2022    K 3.6 12/20/2022    CO2 28.0 12/20/2022     12/20/2022    BUN 10 12/20/2022    CREATININE 1.05 12/20/2022    BCR 9.5 12/20/2022    ANIONGAP 6.0 12/20/2022       Lab Results   Component Value Date    WBC 8.58 12/20/2022    HGB 12.3 (L) 12/20/2022    HCT 36.7 (L) 12/20/2022    MCV 88.2 12/20/2022     12/20/2022       Images:   No Images in the past 120 days found..    Measures:   Tobacco:   Fransico James  reports that he has never smoked. He has never used smokeless tobacco.        Assessment / Plan      Assessment/Plan:   67 y.o. male who presented today for follow up of prostate cancer, status post robotic assisted laparoscopic prostatectomy, right nerve sparing, left wide dissection, bilateral pelvic lymph node dissection 12/19/2022.  Patient has P T3a N0 MX disease by virtue of a small area of extraprostatic extension of the left side, surgical margins and lymph nodes are negative.  We will need to carefully monitor for recurrence in this gentleman.  We discussed he is a bit high risk for recurrence based on his pathology.  We will assess his PSA at 6 weeks and go from there.  We discussed the possibility of PET scans and/or salvage radiation if necessary in the future.  He will continue to heal and we discussed Kegel exercises to prevent stress urinary incontinence, he was right education materials regarding Kegel exercises today.    Diagnoses and all orders for this visit:    1. Prostate cancer (HCC) (Primary)  -     PSA Diagnostic; Future           Follow Up:   Return in about 6 weeks (around 2/7/2023) for Follow Up after Labs.    I spent approximately 30 minutes providing clinical care for this patient; including review of patient's chart and provider documentation, face to face time spent with patient in examination room (obtaining history, performing physical exam, discussing diagnosis and management options), placing  orders, and completing patient documentation.     Deondre Ocasio MD  [facility] [place]

## 2023-02-09 ENCOUNTER — LAB (OUTPATIENT)
Dept: LAB | Facility: HOSPITAL | Age: 68
End: 2023-02-09
Payer: MEDICARE

## 2023-02-09 ENCOUNTER — OFFICE VISIT (OUTPATIENT)
Dept: UROLOGY | Facility: CLINIC | Age: 68
End: 2023-02-09
Payer: MEDICARE

## 2023-02-09 VITALS
WEIGHT: 171 LBS | OXYGEN SATURATION: 97 % | BODY MASS INDEX: 23.94 KG/M2 | SYSTOLIC BLOOD PRESSURE: 126 MMHG | HEART RATE: 62 BPM | DIASTOLIC BLOOD PRESSURE: 78 MMHG | HEIGHT: 71 IN

## 2023-02-09 DIAGNOSIS — R82.81 PYURIA: ICD-10-CM

## 2023-02-09 DIAGNOSIS — N39.3 SUI (STRESS URINARY INCONTINENCE), MALE: Primary | ICD-10-CM

## 2023-02-09 DIAGNOSIS — N52.31 ERECTILE DYSFUNCTION AFTER RADICAL PROSTATECTOMY: ICD-10-CM

## 2023-02-09 DIAGNOSIS — C61 PROSTATE CANCER: ICD-10-CM

## 2023-02-09 LAB
BILIRUB BLD-MCNC: ABNORMAL MG/DL
CLARITY, POC: CLEAR
COLOR UR: YELLOW
EXPIRATION DATE: ABNORMAL
GLUCOSE UR STRIP-MCNC: NEGATIVE MG/DL
KETONES UR QL: ABNORMAL
LEUKOCYTE EST, POC: ABNORMAL
Lab: ABNORMAL
NITRITE UR-MCNC: NEGATIVE MG/ML
PH UR: 5.5 [PH] (ref 5–8)
PROT UR STRIP-MCNC: ABNORMAL MG/DL
PSA SERPL-MCNC: 0.33 NG/ML (ref 0–4)
RBC # UR STRIP: ABNORMAL /UL
SP GR UR: 1.03 (ref 1–1.03)
UROBILINOGEN UR QL: NORMAL

## 2023-02-09 PROCEDURE — 99024 POSTOP FOLLOW-UP VISIT: CPT | Performed by: STUDENT IN AN ORGANIZED HEALTH CARE EDUCATION/TRAINING PROGRAM

## 2023-02-09 PROCEDURE — 87086 URINE CULTURE/COLONY COUNT: CPT | Performed by: STUDENT IN AN ORGANIZED HEALTH CARE EDUCATION/TRAINING PROGRAM

## 2023-02-09 PROCEDURE — 81003 URINALYSIS AUTO W/O SCOPE: CPT | Performed by: STUDENT IN AN ORGANIZED HEALTH CARE EDUCATION/TRAINING PROGRAM

## 2023-02-09 PROCEDURE — 84153 ASSAY OF PSA TOTAL: CPT

## 2023-02-09 PROCEDURE — 36415 COLL VENOUS BLD VENIPUNCTURE: CPT

## 2023-02-09 PROCEDURE — 51798 US URINE CAPACITY MEASURE: CPT | Performed by: STUDENT IN AN ORGANIZED HEALTH CARE EDUCATION/TRAINING PROGRAM

## 2023-02-09 RX ORDER — TADALAFIL 20 MG/1
20 TABLET ORAL AS NEEDED
Qty: 20 TABLET | Refills: 5 | Status: SHIPPED | OUTPATIENT
Start: 2023-02-09 | End: 2023-04-05

## 2023-02-09 RX ORDER — CHOLECALCIFEROL (VITAMIN D3) 10(400)/ML
1 DROPS ORAL DAILY
Qty: 30 EACH | Refills: 5 | Status: SHIPPED | OUTPATIENT
Start: 2023-02-09

## 2023-02-09 NOTE — PROGRESS NOTES
"     Follow Up Office Visit      Patient Name: Fransico James  : 1955   MRN: [id number]     Chief Complaint:    Chief Complaint   Patient presents with   • Prostate Cancer       Referring Provider: No ref. provider found    History of Present Illness: Fransico James is a 67 y.o. male who presents today for follow up of prostate cancer.     He presented to the operating room 2022 for robotic assisted laparoscopic prostatectomy, right nerve sparing, left wide dissection, bilateral pelvic lymph node dissection.    Surgical pathology reveals [id number] [place] 4+3=7 disease with focal EPE.     I last saw the patient 2022 for postoperative visit.  Since his catheter removal he did have expected stress urinary incontinence and was wearing quite a few diapers but reports this continues to improve.  He is now currently using 2 depends diapers per day and he reports a strong stream while in the bathroom.    Did not get PSA done prior to appt. He will need to obtain this today.      A few days ago had some dysuria but this \"went away\". Today his UA with trace LE, trace blood.  He denies urinary tract symptoms.  This could be a result of continued healing of his urethrovesical anastomosis from surgery.    He is having expected postoperative erectile dysfunction.  He wants to try Cialis 20 mg.  We discussed that this may not benefit him for the first 6 to 12 months after prostatectomy and that intracavernosal injections may be better for rapid return of erections, he will consider this.       Subjective      Review of System: Review of Systems   Genitourinary: Positive for frequency and urgency.      I have reviewed the ROS documented by my clinical staff, I have updated appropriately and I agree. Deondre Ocasio MD    I have reviewed and the following portions of the patient's history were updated as appropriate: past family history, past medical history, past social history, past surgical history and " problem list.    Medications:     Current Outpatient Medications:   •  donepezil (ARICEPT) 10 MG tablet, Take 10 mg by mouth Daily., Disp: , Rfl:   •  multivitamin with minerals tablet tablet, Take 1 tablet by mouth Daily., Disp: , Rfl:   •  Incontinence Supply Disposable (Comfort Protect Adlt Diaper/XL) misc, 1 each Daily., Disp: 30 each, Rfl: 5  •  tadalafil (Cialis) 20 MG tablet, Take 1 tablet by mouth As Needed for Erectile Dysfunction., Disp: 20 tablet, Rfl: 5    Allergies:   Allergies   Allergen Reactions   • Penicillins Rash     Tolerates keflex and cephalosporins    • Sulfa Antibiotics Rash       IPSS Questionnaire (AUA-7):  Over the past month…    1)  Incomplete Emptying:       How often have you had a sensation of not emptying you had the sensation of not emptying your bladder completely after you finished urinating?  1 - Less than 1 time in 5   2)  Frequency:       How often have you had the urinate again less than two hours after you finished urinating?  3 - About half the time   3)  Intermittency:       How often have you found you stopped and started again several times when you urinated?   4 - More than half the time   4) Urgency:      How often have you found it difficult to postpone urination?  3 - About half the time   5) Weak Stream:      How often have you had a weak urinary stream?  3 - About half the time   6) Straining:       How often have you had to push or strain to begin urination?  0 - Not at all   7) Nocturia:      How many times did you most typically get up to urinate from the time you went to bed at night until the time you got up in the morning?  4 - 4 times   Total Score:  18   The International Prostate Symptom Score (IPSS) is used to screen, diagnose, track symptoms of benign prostatic hyperplasia (BPH).   0-7 (Mild Symptoms) 8-19 (Moderate) 20-35 (Severe)   Quality of Life (QoL):  If you were to spend the rest of your life with your urinary condition just the way it is now, how  "would you feel about that? 4-Mostly Dissatisfied   Urine Leakage (Incontinence) 3-Moderate (3 or more pads/day)     Sexual Health Inventory for Men (TYRONE)   Over the past 6 months:     1. How do you rate your confidence that you could get and keep an erection?  1 - Very low   2. When you had erections with sexual  stimulation, how often were your erections hard enough for penetration (entering your partner)?  0 - No Sexual Activity    3. During sexual intercourse, how often were you able to maintain your erection after you had penetrated (entered) your partner?  0 - Did not attempt intercourse   4. During sexual intercourse, how difficult was it to maintain your erection to completion of intercourse?  0 - Did not attempt intercourse   5. When you attempted sexual intercourse, how often was it satisfactory for you?  0 - Did not attempt intercourse    Total Score: 1   The Sexual Health Inventory for Men further classifies ED severity with the following breakpoints:   1-7 (Severe ED) 8-11 (Moderate ED) 12-16 (Mild to Moderate ED) 17-21 (Mild ED)      Post void residual bladder scan:   0 mL    Objective     Physical Exam:   Vital Signs:   Vitals:    02/09/23 1114   BP: 126/78   Pulse: 62   SpO2: 97%   Weight: 77.6 kg (171 lb)   Height: 180.3 cm (71\")     Body mass index is 23.85 kg/m².     Physical Exam  Constitutional:       Appearance: Normal appearance.   HENT:      Head: Normocephalic and atraumatic.      Nose: Nose normal.   Eyes:      Extraocular Movements: Extraocular movements intact.      Conjunctiva/sclera: Conjunctivae normal.      Pupils: Pupils are equal, round, and reactive to light.   Abdominal:      Comments: Abdominal port site incisions well approximated with scar, no hernia or purulence or erythema noted   Musculoskeletal:         General: Normal range of motion.      Cervical back: Normal range of motion and neck supple.   Skin:     General: Skin is warm and dry.      Findings: No lesion or rash. "   Neurological:      General: No focal deficit present.      Mental Status: He is alert and oriented to person, place, and time. Mental status is at baseline.   Psychiatric:         Mood and Affect: Mood normal.         Behavior: Behavior normal.         Labs:   Brief Urine Lab Results  (Last result in the past 365 days)      Color   Clarity   Blood   Leuk Est   Nitrite   Protein   CREAT   Urine HCG        02/09/23 1129 Yellow   Clear   Trace   Trace   Negative   Trace                      Lab Results   Component Value Date    GLUCOSE 96 12/20/2022    CALCIUM 8.4 (L) 12/20/2022     12/20/2022    K 3.6 12/20/2022    CO2 28.0 12/20/2022     12/20/2022    BUN 10 12/20/2022    CREATININE 1.05 12/20/2022    BCR 9.5 12/20/2022    ANIONGAP 6.0 12/20/2022       Lab Results   Component Value Date    WBC 8.58 12/20/2022    HGB 12.3 (L) 12/20/2022    HCT 36.7 (L) 12/20/2022    MCV 88.2 12/20/2022     12/20/2022       Images:   No Images in the past 120 days found..    Measures:   Tobacco:   Fransico James  reports that he has never smoked. He has never used smokeless tobacco.    Assessment / Plan      Assessment/Plan:   67 y.o. male who presented today for follow up of prostate cancer, status post robotic prostatectomy, Surgical pathology reveals [id number] Maxi 4+3=7 disease with focal EPE.  He has high risk for recurrence and we definitely need to get a PSA today to make sure that this PSA has fallen to undetectable levels.  If it has not, we may consider continued PSA monitoring for a time prior to the consideration of repeat staging imaging with PSMA PET scan, this is typically only useful for PSA around level of 1.  Given negative margins at his surgery I am hopeful that his PSA will fall to undetectable.  He is having some persistent stress urinary incontinence and we will monitor this.  He can be referred to pelvic floor physical therapy if this continues to be a problem.  But this appears to be  rapidly improving.  He wants to try max dose Cialis for his erectile dysfunction but I discussed this may not work very well given it may take 6 to 12 months for any erectile function improvement.  Intracavernosal injections with Trimix are likely a better option and he was offered this medication and test dose and teaching visit with my nurse practitioner, he will consider this.  Intracavernosal injection educational materials were provided.    Diagnoses and all orders for this visit:    1. JO (stress urinary incontinence), male (Primary)  -     Incontinence Supply Disposable (Comfort Protect Adlt Diaper/XL) misc; 1 each Daily.  Dispense: 30 each; Refill: 5    2. Prostate cancer (HCC)  -     POC Urinalysis Dipstick, Automated  -     Incontinence Supply Disposable (Comfort Protect Adlt Diaper/XL) misc; 1 each Daily.  Dispense: 30 each; Refill: 5  -     PSA Diagnostic; Future    3. Erectile dysfunction after radical prostatectomy  -     tadalafil (Cialis) 20 MG tablet; Take 1 tablet by mouth As Needed for Erectile Dysfunction.  Dispense: 20 tablet; Refill: 5           Follow Up:   Return in about 3 months (around 5/9/2023).    I spent approximately 20 minutes providing clinical care for this patient; including review of patient's chart and provider documentation, face to face time spent with patient in examination room (obtaining history, performing physical exam, discussing diagnosis and management options), placing orders, and completing patient documentation.     Deondre Ocasio MD  [facility] [place]

## 2023-02-10 LAB — BACTERIA SPEC AEROBE CULT: NO GROWTH

## 2023-02-11 ENCOUNTER — TELEPHONE (OUTPATIENT)
Dept: UROLOGY | Facility: CLINIC | Age: 68
End: 2023-02-11
Payer: MEDICARE

## 2023-02-11 DIAGNOSIS — R97.21 BIOCHEMICALLY RECURRENT MALIGNANT NEOPLASM OF PROSTATE: Primary | ICD-10-CM

## 2023-02-11 DIAGNOSIS — C61 BIOCHEMICALLY RECURRENT MALIGNANT NEOPLASM OF PROSTATE: Primary | ICD-10-CM

## 2023-02-11 NOTE — TELEPHONE ENCOUNTER
The patient completed 6-week postop PSA after radical prostatectomy.  The patient had concerning high risk features including extraprostatic extension, perineural invasion however his surgical margins were negative and lymph nodes were negative for involvement.      I suspect there is a high risk of microscopic metastases locally by virtue of grade group 5 disease (GG5) and PSA is 0.33 and did not fall to undetectable levels.  I called the patient and discussed the situation with him.  At this juncture I recommend a PSMA PET scan to evaluate for advanced or metastatic disease, I discussed the option of salvage radiation therapy pending findings.  I will have my office schedule him for a PSMA PET scan and follow-up in 1 month to review.  Options will include salvage pelvic radiation versus continued PSA monitoring, the patient reports understanding.    PLAN  - PSMA Pet scan  - f/u in clinic after imaging to review findings    Deondre Ocasio MD

## 2023-02-23 ENCOUNTER — HOSPITAL ENCOUNTER (OUTPATIENT)
Dept: PET IMAGING | Facility: HOSPITAL | Age: 68
Discharge: HOME | End: 2023-02-23
Payer: MEDICARE

## 2023-02-23 DIAGNOSIS — C61 BIOCHEMICALLY RECURRENT MALIGNANT NEOPLASM OF PROSTATE: ICD-10-CM

## 2023-02-23 DIAGNOSIS — R97.21 BIOCHEMICALLY RECURRENT MALIGNANT NEOPLASM OF PROSTATE: ICD-10-CM

## 2023-02-23 PROCEDURE — 78815 PET IMAGE W/CT SKULL-THIGH: CPT

## 2023-02-23 PROCEDURE — 0 PIFLUFOLASTAT F 18 9 MCI SOLUTION PREFILLED SYRINGE: Performed by: STUDENT IN AN ORGANIZED HEALTH CARE EDUCATION/TRAINING PROGRAM

## 2023-02-23 PROCEDURE — A9595 PIFLUFOLASTAT F 18 9 MCI SOLUTION PREFILLED SYRINGE: HCPCS | Performed by: STUDENT IN AN ORGANIZED HEALTH CARE EDUCATION/TRAINING PROGRAM

## 2023-02-23 RX ADMIN — PIFLUFOLASTAT F-18 1 DOSE: 80 INJECTION INTRAVENOUS at 12:13

## 2023-02-28 NOTE — PROGRESS NOTES
PSMA pet scan negative for overt concerns.     Will review negative PSMA Pet scan findings and next steps at patient's follow up in March as scheduled.     Deondre Ocasio MD

## 2023-03-07 ENCOUNTER — TELEPHONE (OUTPATIENT)
Dept: UROLOGY | Facility: CLINIC | Age: 68
End: 2023-03-07
Payer: MEDICARE

## 2023-03-07 ENCOUNTER — LAB (OUTPATIENT)
Dept: LAB | Facility: HOSPITAL | Age: 68
End: 2023-03-07
Payer: MEDICARE

## 2023-03-07 DIAGNOSIS — C61 PROSTATE CANCER: ICD-10-CM

## 2023-03-07 LAB — PSA SERPL-MCNC: 0.3 NG/ML (ref 0–4)

## 2023-03-07 PROCEDURE — 36415 COLL VENOUS BLD VENIPUNCTURE: CPT

## 2023-03-07 PROCEDURE — 84153 ASSAY OF PSA TOTAL: CPT

## 2023-03-07 NOTE — TELEPHONE ENCOUNTER
Patient called and said that he was suppose to have a PSA drawn for his next visit with you on 3/9/23, but needs an order to get this done.  I didn't see one in his chart, do you want him to do it before this visit?

## 2023-03-08 NOTE — TELEPHONE ENCOUNTER
I called the patient to let him know that there was a lab order for him in the chart, and he said that he went and had it drawn yesterday evening.

## 2023-03-09 ENCOUNTER — OFFICE VISIT (OUTPATIENT)
Dept: UROLOGY | Facility: CLINIC | Age: 68
End: 2023-03-09
Payer: MEDICARE

## 2023-03-09 VITALS
SYSTOLIC BLOOD PRESSURE: 120 MMHG | OXYGEN SATURATION: 98 % | HEIGHT: 71 IN | HEART RATE: 55 BPM | BODY MASS INDEX: 23.94 KG/M2 | WEIGHT: 171 LBS | DIASTOLIC BLOOD PRESSURE: 76 MMHG

## 2023-03-09 DIAGNOSIS — N39.3 SUI (STRESS URINARY INCONTINENCE), MALE: ICD-10-CM

## 2023-03-09 DIAGNOSIS — R97.21 BIOCHEMICALLY RECURRENT MALIGNANT NEOPLASM OF PROSTATE: Primary | ICD-10-CM

## 2023-03-09 DIAGNOSIS — N52.31 ERECTILE DYSFUNCTION AFTER RADICAL PROSTATECTOMY: ICD-10-CM

## 2023-03-09 DIAGNOSIS — C61 BIOCHEMICALLY RECURRENT MALIGNANT NEOPLASM OF PROSTATE: Primary | ICD-10-CM

## 2023-03-09 DIAGNOSIS — C61 PROSTATE CANCER: ICD-10-CM

## 2023-03-09 LAB
BILIRUB BLD-MCNC: NEGATIVE MG/DL
CLARITY, POC: CLEAR
COLOR UR: YELLOW
EXPIRATION DATE: ABNORMAL
GLUCOSE UR STRIP-MCNC: NEGATIVE MG/DL
KETONES UR QL: NEGATIVE
LEUKOCYTE EST, POC: NEGATIVE
Lab: ABNORMAL
NITRITE UR-MCNC: NEGATIVE MG/ML
PH UR: 5.5 [PH] (ref 5–8)
PROT UR STRIP-MCNC: ABNORMAL MG/DL
RBC # UR STRIP: NEGATIVE /UL
SP GR UR: 1.03 (ref 1–1.03)
UROBILINOGEN UR QL: NORMAL

## 2023-03-09 PROCEDURE — 81003 URINALYSIS AUTO W/O SCOPE: CPT | Performed by: STUDENT IN AN ORGANIZED HEALTH CARE EDUCATION/TRAINING PROGRAM

## 2023-03-09 PROCEDURE — 1160F RVW MEDS BY RX/DR IN RCRD: CPT | Performed by: STUDENT IN AN ORGANIZED HEALTH CARE EDUCATION/TRAINING PROGRAM

## 2023-03-09 PROCEDURE — 99024 POSTOP FOLLOW-UP VISIT: CPT | Performed by: STUDENT IN AN ORGANIZED HEALTH CARE EDUCATION/TRAINING PROGRAM

## 2023-03-09 PROCEDURE — 1159F MED LIST DOCD IN RCRD: CPT | Performed by: STUDENT IN AN ORGANIZED HEALTH CARE EDUCATION/TRAINING PROGRAM

## 2023-03-09 PROCEDURE — 51798 US URINE CAPACITY MEASURE: CPT | Performed by: STUDENT IN AN ORGANIZED HEALTH CARE EDUCATION/TRAINING PROGRAM

## 2023-03-09 NOTE — PROGRESS NOTES
Follow Up Office Visit      Patient Name: Fransico James  : 1955   MRN: [id number]     Chief Complaint:    Chief Complaint   Patient presents with   • Prostate Cancer   • JO       Referring Provider: No ref. provider found    History of Present Illness: Fransico James is a 67 y.o. male who presents today for follow up of prostate cancer.  His biopsy pathology demonstrated [place] 3+4 equal 7 (grade group 2) disease and [place] 3+3 equal 6 (grade group 1 prostate cancer.  He presented to the operating room 2022 for robotic assisted laparoscopic prostatectomy, right nerve sparing, left wide dissection, bilateral pelvic lymph node dissection.     Surgical pathology reveals [id number] [place] 4+3=7 disease with focal EPE. He had 5% tertiary pattern 5 disease.  This represents significant disease upgrading.  Surgical pathology demonstrated negative margins.  Patient is roughly 3 months postop at this point.    Reports strong stream. Denies incomplete emptying.     Has nocturnal polyuria waking 3-4 times.    During day has minimal JO, has been performing Kegel exercise.  Wears pads for security and is overall fairly dry during the day now.    Lab Results   Component Value Date    PSA 0.305 2023    PSA 0.333 2023     Patient has PSA persistent disease.  His PSA continues to slowly decline but is currently 0.305.    Referring back to radiation oncology to plan adjuvant radiation.     PSMA pet scan performed 23 demonstrates no obvious areas of metastatic disease.  I reviewed the patient's PSMA PET scan today with him.      Subjective      Review of System: Review of Systems   Genitourinary: Negative for decreased urine volume, difficulty urinating, dysuria, enuresis, flank pain, frequency, hematuria and urgency.      I have reviewed the ROS documented by my clinical staff, I have updated appropriately and I agree. Deondre Ocasio MD    I have reviewed and the following portions of the  "patient's history were updated as appropriate: past family history, past medical history, past social history, past surgical history and problem list.    Medications:     Current Outpatient Medications:   •  donepezil (ARICEPT) 10 MG tablet, Take 1 tablet by mouth Daily., Disp: , Rfl:   •  Incontinence Supply Disposable (Comfort Protect Adlt Diaper/XL) misc, 1 each Daily., Disp: 30 each, Rfl: 5  •  multivitamin with minerals tablet tablet, Take 1 tablet by mouth Daily. Indications: ok to restart in 3-5 days, Disp: , Rfl:   •  tadalafil (Cialis) 20 MG tablet, Take 1 tablet by mouth As Needed for Erectile Dysfunction., Disp: 20 tablet, Rfl: 5    Allergies:   Allergies   Allergen Reactions   • Penicillins Rash     Tolerates keflex and cephalosporins    • Sulfa Antibiotics Rash          Post void residual bladder scan:   0 mL    Objective     Physical Exam:   Vital Signs:   Vitals:    03/09/23 1117   BP: 120/76   Pulse: 55   SpO2: 98%   Weight: 77.6 kg (171 lb)   Height: 180.3 cm (71\")     Body mass index is 23.85 kg/m².     Physical Exam  Constitutional:       Appearance: Normal appearance.   HENT:      Head: Normocephalic and atraumatic.      Nose: Nose normal.   Eyes:      Extraocular Movements: Extraocular movements intact.      Conjunctiva/sclera: Conjunctivae normal.      Pupils: Pupils are equal, round, and reactive to light.   Abdominal:      Comments: Robotic port site incisions well approximated with scar, no hernia.   Musculoskeletal:         General: Normal range of motion.      Cervical back: Normal range of motion and neck supple.   Skin:     General: Skin is warm and dry.      Findings: No lesion or rash.   Neurological:      General: No focal deficit present.      Mental Status: He is alert and oriented to person, place, and time. Mental status is at baseline.   Psychiatric:         Mood and Affect: Mood normal.         Behavior: Behavior normal.         Labs:   Brief Urine Lab Results  (Last result in " the past 365 days)      Color   Clarity   Blood   Leuk Est   Nitrite   Protein   CREAT   Urine HCG        03/09/23 1128 Yellow   Clear   Negative   Negative   Negative   Trace                 Urine Culture    Urine Culture 2/9/23   Urine Culture No growth              Lab Results   Component Value Date    GLUCOSE 96 12/20/2022    CALCIUM 8.4 (L) 12/20/2022     12/20/2022    K 3.6 12/20/2022    CO2 28.0 12/20/2022     12/20/2022    BUN 10 12/20/2022    CREATININE 1.05 12/20/2022    BCR 9.5 12/20/2022    ANIONGAP 6.0 12/20/2022       Lab Results   Component Value Date    WBC 8.58 12/20/2022    HGB 12.3 (L) 12/20/2022    HCT 36.7 (L) 12/20/2022    MCV 88.2 12/20/2022     12/20/2022       Images:   NM PET/CT Skull Base to Mid Thigh    Result Date: 2/24/2023  No PET evidence of PyL binding to suggest recurrent or metastatic prostate malignancy. Few tiny pleural-based nodules seen in the lungs measuring up to 3 mm may represent normal intrapulmonary lymph nodes. However recommend attention on follow-up. Electronically Signed: Herbert Shirley  2/24/2023 9:25 AM EST  Workstation ID: [id number]      Measures:   Tobacco:   Fransico Good Jacob  reports that he has never smoked. He has never used smokeless tobacco.      Assessment / Plan      Assessment/Plan:   67 y.o. male who presented today for follow up of grade group 3 and tertiary pattern 5 prostate cancer after radical prostatectomy.  He had significant upgrading at the time of radical prostatectomy.  All margins were negative.  Despite negative margins his PSA has not nadired to undetectable.  He has PSA persistent disease.    Has nearly recovered his continence.  He underwent a PSMA PET scan for further evaluation of this demonstrates no obvious findings of metastasis.  I discussed with the patient based on tertiary pattern 5 he likely has micrometastatic spread to the local pelvic region.    I discussed the importance of consideration of adjuvant radiation  therapy once he has recovered his continence to prevent further local or metastatic spread.  We discussed the AUA and NCCN guidelines for PSA persistent disease with the consideration of adjuvant radiation with or without androgen deprivation therapy for best long-term cancer survival.  It would be reasonable to initiate adjuvant radiation therapy sometime in May to give him time to recover continence prior.    I will refer the patient back to radiation oncology Dr. Vaughan for consideration.      Diagnoses and all orders for this visit:    1. Biochemically recurrent malignant neoplasm of prostate (HCC) (Primary)  -     [facility]  -     Ambulatory Referral to [facility]  -     PSA Diagnostic; Future    2. Prostate cancer (HCC)  -     POC Urinalysis Dipstick, Automated  -     Cancel: Ambulatory Referral to Radiation [facility]  -     Ambulatory Referral to Radiation [facility]  -     PSA Diagnostic; Future    3. JO (stress urinary incontinence), male  -     POC Urinalysis Dipstick, Automated    4. Erectile dysfunction after radical prostatectomy  -Patient would like to continue tadalafil, we discussed he may not have any appreciable erectile function for the first 6 to 12 months after prostatectomy despite nerve sparing, we also discussed intracavernosal injections which he would like to defer at this time         Follow Up:   Follow-up in May with PSA prior    I spent approximately 30 minutes providing clinical care for this patient; including review of patient's chart and provider documentation, face to face time spent with patient in examination room (obtaining history, performing physical exam, discussing diagnosis and management options), placing orders, and completing patient documentation.     Deondre Ocasio MD  [facility]

## 2023-03-10 PROBLEM — N52.31 ERECTILE DYSFUNCTION AFTER RADICAL PROSTATECTOMY: Status: ACTIVE | Noted: 2023-03-10

## 2023-03-16 ENCOUNTER — HOSPITAL ENCOUNTER (OUTPATIENT)
Dept: RADIATION ONCOLOGY | Facility: HOSPITAL | Age: 68
Setting detail: RADIATION/ONCOLOGY SERIES
Discharge: HOME | End: 2023-03-16
Payer: MEDICARE

## 2023-04-05 ENCOUNTER — OFFICE VISIT (OUTPATIENT)
Dept: RADIATION ONCOLOGY | Facility: HOSPITAL | Age: 68
End: 2023-04-05
Payer: MEDICARE

## 2023-04-05 ENCOUNTER — HOSPITAL ENCOUNTER (OUTPATIENT)
Dept: RADIATION ONCOLOGY | Facility: HOSPITAL | Age: 68
Setting detail: RADIATION/ONCOLOGY SERIES
Discharge: HOME | End: 2023-04-05
Payer: MEDICARE

## 2023-04-05 VITALS
TEMPERATURE: 97.3 F | OXYGEN SATURATION: 99 % | WEIGHT: 175.9 LBS | BODY MASS INDEX: 24.53 KG/M2 | DIASTOLIC BLOOD PRESSURE: 70 MMHG | SYSTOLIC BLOOD PRESSURE: 149 MMHG | HEART RATE: 61 BPM | RESPIRATION RATE: 20 BRPM

## 2023-04-05 DIAGNOSIS — C61 PROSTATE CANCER: Primary | ICD-10-CM

## 2023-04-05 PROCEDURE — G0463 HOSPITAL OUTPT CLINIC VISIT: HCPCS
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
## 2023-04-11 ENCOUNTER — PRIOR AUTHORIZATION (OUTPATIENT)
Dept: UROLOGY | Facility: CLINIC | Age: 68
End: 2023-04-11
Payer: MEDICARE

## 2023-04-11 ENCOUNTER — TELEPHONE (OUTPATIENT)
Dept: UROLOGY | Facility: CLINIC | Age: 68
End: 2023-04-11
Payer: MEDICARE

## 2023-04-11 NOTE — TELEPHONE ENCOUNTER
----- Message from Salina Bee sent at 4/10/2023 12:16 PM EDT -----  Rad Onc reached out and wanted this patient scheduled on 5/4 for a Lupron injection before his appt with Dr. Peacock. Ann Marie - can we please make sure this is approved through his insurance? Thanks!

## 2023-04-11 NOTE — TELEPHONE ENCOUNTER
2021     Leonel Valdes (:  1944) is a 64 y.o. female, here for evaluation of the following medical concerns:    HPI  1.  DM II- A1C 6.0  today, taking Metformin, exercising, Amaryl 2 mg, she believes she is well controlled, walks with her , feels well at this anastasiia.      2.  HTN- well controlled at this time, denied side effects from the medication, no headache, vision change or dizziness.      3.  Hyperlipidemia- well controlled on the medication, denied side effects, feels well.      Today , chest pain, sob, n, v, or diarrhea.      Review of Systems   Constitutional: Negative for activity change, fatigue, fever and unexpected weight change. HENT: Negative for congestion, ear pain, rhinorrhea, sinus pressure and sore throat. Respiratory: Negative for cough, chest tightness, shortness of breath and wheezing. Cardiovascular: Negative for chest pain and leg swelling. Gastrointestinal: Negative for abdominal pain, diarrhea, nausea and vomiting. Endocrine: Negative for cold intolerance, heat intolerance, polydipsia and polyphagia. Genitourinary: Negative for vaginal bleeding. Musculoskeletal: Negative for arthralgias. Skin: Negative for rash. Neurological: Negative for dizziness, numbness and headaches. Psychiatric/Behavioral: Negative for dysphoric mood. The patient is not nervous/anxious. Prior to Visit Medications    Medication Sig Taking?  Authorizing Provider   metoprolol tartrate (LOPRESSOR) 50 MG tablet TAKE ONE TABLET BY MOUTH TWICE A DAY Yes Estuardo Moya, DO   glimepiride (AMARYL) 2 MG tablet TAKE ONE TABLET BY MOUTH EVERY MORNING Yes Александр Gimenez, DO   lisinopril-hydroCHLOROthiazide (PRINZIDE;ZESTORETIC) 20-12.5 MG per tablet TAKE ONE TABLET BY MOUTH DAILY Yes Estuardo Moya, DO   metFORMIN (GLUCOPHAGE) 1000 MG tablet TAKE ONE TABLET BY MOUTH TWICE A DAY WITH MEALS Yes Estuardo Moya, DO   atorvastatin (LIPITOR) 20 MG tablet Take 1 tablet by mouth Faxed PA to insurance for Lupron Injection.   BWBYAGRE   daily Yes Mallika Mendoza DO   Cholecalciferol (VITAMIN D3) 2000 units CAPS Take 1 capsule by mouth daily Yes Historical Provider, MD   Multiple Vitamin (MULTI VITAMIN DAILY PO) Take by mouth Yes Historical Provider, MD   aspirin 81 MG tablet Take 81 mg by mouth daily. Yes Historical Provider, MD   Glucose Blood (NUVIA BREEZE 2 TEST) DISK by In Vitro route. Yes Historical Provider, MD        Social History     Tobacco Use    Smoking status: Never Smoker    Smokeless tobacco: Never Used   Substance Use Topics    Alcohol use: No        Vitals:    11/23/21 0802   BP: 136/68   Pulse: 65   SpO2: 98%   Weight: 186 lb 6.4 oz (84.6 kg)     Estimated body mass index is 31.02 kg/m² as calculated from the following:    Height as of 7/22/21: 5' 5\" (1.651 m). Weight as of this encounter: 186 lb 6.4 oz (84.6 kg). Physical Exam  Vitals and nursing note reviewed. Constitutional:       Appearance: She is well-developed. HENT:      Head: Normocephalic and atraumatic. Right Ear: Tympanic membrane, ear canal and external ear normal.      Left Ear: Tympanic membrane, ear canal and external ear normal.      Nose: Nose normal.   Eyes:      Conjunctiva/sclera: Conjunctivae normal.      Pupils: Pupils are equal, round, and reactive to light. Cardiovascular:      Rate and Rhythm: Normal rate and regular rhythm. Heart sounds: Normal heart sounds. No murmur heard. Pulmonary:      Effort: Pulmonary effort is normal.      Breath sounds: Normal breath sounds. No wheezing. Abdominal:      General: Bowel sounds are normal.      Palpations: Abdomen is soft. Tenderness: There is no abdominal tenderness. Musculoskeletal:         General: Normal range of motion. Cervical back: Normal range of motion and neck supple. Skin:     General: Skin is warm and dry. Neurological:      Mental Status: She is alert and oriented to person, place, and time.    Psychiatric:         Behavior: Behavior normal. ASSESSMENT/PLAN:  1. Type 2 diabetes mellitus without complication, without long-term current use of insulin ([address]  Patient will need to keep a log of his glucose readings and bring them to the office. He needs to monitor his diet and exercise. Attempt to lose weight. Discussed proper eating habits. Continue to take medication as prescribed. Will obtain A1C at this visit. Educated on signs and symptoms for immediate evaluation in the ER.   - POCT glycosylated hemoglobin (Hb A1C)  - CBC  - Comprehensive Metabolic Panel  - Lipid Panel    2. Mixed hyperlipidemia  Take medication as prescribed. Discussed the need to exercise 30 minutes each day. Monitor diet, avoid fried foods etc... Educated on need to reduce cholesterol in diet and results of poor diet. 3. HTN  well controlled. Discussed signs and symptoms for immediate evaluation in the ER. Reduce sodium. Monitor diet, exercise and lose weight. Keep a BP log and bring it to your next appointment. No follow-ups on file.

## 2023-04-12 NOTE — TELEPHONE ENCOUNTER
PA for Lupron Injection 45 mg 6 month kit was DENIED.      Faxing documentation to appeal the PA, faxing office note, Pathology, labs to [employer] at  [phone].

## 2023-04-12 NOTE — TELEPHONE ENCOUNTER
PA for Lupron Injection 45 mg 6 month kit was DENIED.  Faxing documentation to appeal the PA, faxing office note, labs. To [phone].

## 2023-04-17 ENCOUNTER — TELEPHONE (OUTPATIENT)
Dept: UROLOGY | Facility: CLINIC | Age: 68
End: 2023-04-17
Payer: MEDICARE

## 2023-04-17 NOTE — TELEPHONE ENCOUNTER
Contacted Marley from [employer] and provided her with the  code that she needed for the patient.    [phone]

## 2023-04-17 NOTE — TELEPHONE ENCOUNTER
Marley from [employer] call asking for the J code for the Lupron Injection 6 month kit.      Informed her I would ask my manager and get back to her. [phone]      Salina, please advise.  Thank you.

## 2023-04-21 ENCOUNTER — TELEPHONE (OUTPATIENT)
Dept: UROLOGY | Facility: CLINIC | Age: 68
End: 2023-04-21
Payer: MEDICARE

## 2023-04-21 NOTE — TELEPHONE ENCOUNTER
PA for Lupron Injection for patient has been DENIED by insurance.    Faxed documentation supporting the diagnosis pt has, but PA was still DENIED.    Sending information to Dr. Ocasio and Salina.

## 2023-04-21 NOTE — TELEPHONE ENCOUNTER
----- Message from Salina Bee sent at 4/10/2023 12:16 PM EDT -----  Rad Onc reached out and wanted this patient scheduled on 5/4 for a Lupron injection before his appt with Dr. Peacock. Ann Marie - can we please make sure this is approved through his insurance? Thanks!      Increased Nutrient Needs...

## 2023-04-24 ENCOUNTER — PREP FOR SURGERY (OUTPATIENT)
Dept: OTHER | Facility: HOSPITAL | Age: 68
End: 2023-04-24
Payer: MEDICARE

## 2023-04-24 ENCOUNTER — TELEPHONE (OUTPATIENT)
Dept: UROLOGY | Facility: CLINIC | Age: 68
End: 2023-04-24
Payer: MEDICARE

## 2023-04-24 DIAGNOSIS — C61 RECURRENT PROSTATE CANCER: Primary | ICD-10-CM

## 2023-04-24 NOTE — TELEPHONE ENCOUNTER
Was on hold for more than 15 minutes with insurance, was been transferred from department to another department.    No peer to peer review as arranged.

## 2023-05-02 ENCOUNTER — LAB (OUTPATIENT)
Dept: LAB | Facility: HOSPITAL | Age: 68
End: 2023-05-02
Payer: MEDICARE

## 2023-05-02 ENCOUNTER — HOSPITAL ENCOUNTER (OUTPATIENT)
Dept: ONCOLOGY | Facility: HOSPITAL | Age: 68
Discharge: HOME | End: 2023-05-02
Payer: MEDICARE

## 2023-05-02 VITALS
SYSTOLIC BLOOD PRESSURE: 128 MMHG | RESPIRATION RATE: 18 BRPM | HEART RATE: 54 BPM | DIASTOLIC BLOOD PRESSURE: 73 MMHG | TEMPERATURE: 97 F | WEIGHT: 176 LBS | BODY MASS INDEX: 24.64 KG/M2 | HEIGHT: 71 IN

## 2023-05-02 DIAGNOSIS — C61 BIOCHEMICALLY RECURRENT MALIGNANT NEOPLASM OF PROSTATE: ICD-10-CM

## 2023-05-02 DIAGNOSIS — C61 PROSTATE CANCER: Primary | ICD-10-CM

## 2023-05-02 DIAGNOSIS — R97.21 BIOCHEMICALLY RECURRENT MALIGNANT NEOPLASM OF PROSTATE: ICD-10-CM

## 2023-05-02 DIAGNOSIS — C61 PROSTATE CANCER: ICD-10-CM

## 2023-05-02 LAB — PSA SERPL-MCNC: 0.37 NG/ML (ref 0–4)

## 2023-05-02 PROCEDURE — 84153 ASSAY OF PSA TOTAL: CPT

## 2023-05-02 PROCEDURE — 96402 CHEMO HORMON ANTINEOPL SQ/IM: CPT

## 2023-05-02 PROCEDURE — 25010000002 LEUPROLIDE (6 MONTH) PER 7.5 MG: Performed by: STUDENT IN AN ORGANIZED HEALTH CARE EDUCATION/TRAINING PROGRAM

## 2023-05-02 PROCEDURE — 96372 THER/PROPH/DIAG INJ SC/IM: CPT

## 2023-05-02 PROCEDURE — 36415 COLL VENOUS BLD VENIPUNCTURE: CPT

## 2023-05-02 RX ADMIN — LEUPROLIDE ACETATE 45 MG: KIT SUBCUTANEOUS at 13:39

## 2023-05-04 ENCOUNTER — OFFICE VISIT (OUTPATIENT)
Dept: UROLOGY | Facility: CLINIC | Age: 68
End: 2023-05-04
Payer: MEDICARE

## 2023-05-04 ENCOUNTER — HOSPITAL ENCOUNTER (OUTPATIENT)
Dept: RADIATION ONCOLOGY | Facility: HOSPITAL | Age: 68
Discharge: HOME | End: 2023-05-04

## 2023-05-04 ENCOUNTER — HOSPITAL ENCOUNTER (OUTPATIENT)
Dept: RADIATION ONCOLOGY | Facility: HOSPITAL | Age: 68
Setting detail: RADIATION/ONCOLOGY SERIES
Discharge: HOME | End: 2023-05-04

## 2023-05-04 VITALS — HEIGHT: 71 IN | BODY MASS INDEX: 24.64 KG/M2 | WEIGHT: 176 LBS

## 2023-05-04 DIAGNOSIS — C61 RECURRENT PROSTATE CANCER: Primary | ICD-10-CM

## 2023-05-04 NOTE — PROGRESS NOTES
Follow Up Office Visit      Patient Name: Fransico James  : 1955   MRN: [id number]     Chief Complaint:    Chief Complaint   Patient presents with   • JO   • Prostate Cancer   • Erectile Dysfunction       Referring Provider: No ref. provider found    History of Present Illness: Fransico James is a 67 y.o. male who presents today for follow up of biochemically recurrent prostate cancer.      His biopsy pathology demonstrated Maxi 3+4 equal 7 (grade group 2) disease and [place] 3+3 equal 6 (grade group 1 prostate cancer.  He presented to the operating room 2022 for robotic assisted laparoscopic prostatectomy, right nerve sparing, left wide dissection, bilateral pelvic lymph node dissection.     Surgical pathology reveals [id number] Maxi 4+3=7 disease with focal EPE. He had 5% tertiary pattern 5 disease.  This represents significant disease upgrading.  Surgical pathology demonstrated negative margins.  Patient is 5 months out from his procedure.    His PSA continues to slowly rise.  He underwent Lupron injection 2 days ago at the cancer center.  He is meeting with Dr. Vaughan today to discuss salvage pelvic radiation.  He has undergone a PSMA PET scan on 2023 which demonstrated no obvious evidence of metastatic disease.    Lab Results   Component Value Date    PSA 0.371 2023    PSA 0.305 2023    PSA 0.333 2023     Luckily, the patient reports he has regained full bladder control.  He states he wears a safety pad but rarely leaks.  States he has a strong stream.      Subjective      Review of System: Review of Systems   Genitourinary: Negative for decreased urine volume, difficulty urinating, dysuria, enuresis, flank pain, frequency, hematuria and urgency.      I have reviewed the ROS documented by my clinical staff, I have updated appropriately and I agree. Deondre Ocasio MD    I have reviewed and the following portions of the patient's history were updated as  appropriate: past family history, past medical history, past social history, past surgical history and problem list.    Medications:     Current Outpatient Medications:   •  donepezil (ARICEPT) 10 MG tablet, Take 1 tablet by mouth Daily., Disp: , Rfl:   •  Incontinence Supply Disposable (Comfort Protect Adlt Diaper/XL) misc, 1 each Daily., Disp: 30 each, Rfl: 5  •  multivitamin with minerals tablet tablet, Take 1 tablet by mouth Daily. Indications: ok to restart in 3-5 days, Disp: , Rfl:     Allergies:   Allergies   Allergen Reactions   • Penicillins Rash     Tolerates keflex and cephalosporins    • Sulfa Antibiotics Rash       IPSS Questionnaire (AUA-7):  Over the past month…    1)  Incomplete Emptying:       How often have you had a sensation of not emptying you had the sensation of not emptying your bladder completely after you finished urinating?  2 - Less than half the time   2)  Frequency:       How often have you had the urinate again less than two hours after you finished urinating?  2 - Less than half the time   3)  Intermittency:       How often have you found you stopped and started again several times when you urinated?   4 - More than half the time   4) Urgency:      How often have you found it difficult to postpone urination?  2 - Less than half the time   5) Weak Stream:      How often have you had a weak urinary stream?  2 - Less than half the time   6) Straining:       How often have you had to push or strain to begin urination?  0 - Not at all   7) Nocturia:      How many times did you most typically get up to urinate from the time you went to bed at night until the time you got up in the morning?  4 - 4 times   Total Score:  16   The International Prostate Symptom Score (IPSS) is used to screen, diagnose, track symptoms of benign prostatic hyperplasia (BPH).   0-7 (Mild Symptoms) 8-19 (Moderate) 20-35 (Severe)   Quality of Life (QoL):  If you were to spend the rest of your life with your urinary  "condition just the way it is now, how would you feel about that? 5-Unhappy   Urine Leakage (Incontinence) 0-No Leakage     Sexual Health Inventory for Men (TYRONE)   Over the past 6 months:     1. How do you rate your confidence that you could get and keep an erection?  0 - No sexual activity    2. When you had erections with sexual  stimulation, how often were your erections hard enough for penetration (entering your partner)?  0 - No Sexual Activity    3. During sexual intercourse, how often were you able to maintain your erection after you had penetrated (entered) your partner?  0 - Did not attempt intercourse   4. During sexual intercourse, how difficult was it to maintain your erection to completion of intercourse?  0 - Did not attempt intercourse   5. When you attempted sexual intercourse, how often was it satisfactory for you?  0 - Did not attempt intercourse    Total Score: 0   The Sexual Health Inventory for Men further classifies ED severity with the following breakpoints:   1-7 (Severe ED) 8-11 (Moderate ED) 12-16 (Mild to Moderate ED) 17-21 (Mild ED)      Post void residual bladder scan:   0 mL     Objective     Physical Exam:   Vital Signs:   Vitals:    05/04/23 1045   Weight: 79.8 kg (176 lb)   Height: 180.3 cm (71\")     Body mass index is 24.55 kg/m².     Physical Exam  Constitutional:       Appearance: Normal appearance.   HENT:      Head: Normocephalic and atraumatic.      Nose: Nose normal.   Eyes:      Extraocular Movements: Extraocular movements intact.      Conjunctiva/sclera: Conjunctivae normal.      Pupils: Pupils are equal, round, and reactive to light.   Abdominal:      Comments: Robotic port site incision well approximated with scar, no hernia   Musculoskeletal:         General: Normal range of motion.      Cervical back: Normal range of motion and neck supple.   Skin:     General: Skin is warm and dry.      Findings: No lesion or rash.   Neurological:      General: No focal deficit present. "      Mental Status: He is alert and oriented to person, place, and time. Mental status is at baseline.   Psychiatric:         Mood and Affect: Mood normal.         Behavior: Behavior normal.         Labs:   Brief Urine Lab Results  (Last result in the past 365 days)      Color   Clarity   Blood   Leuk Est   Nitrite   Protein   CREAT   Urine HCG        03/09/23 1128 Yellow   Clear   Negative   Negative   Negative   Trace                 Urine Culture        2/9/2023    11:00   Urine Culture   Urine Culture No growth          Lab Results   Component Value Date    GLUCOSE 96 12/20/2022    CALCIUM 8.4 (L) 12/20/2022     12/20/2022    K 3.6 12/20/2022    CO2 28.0 12/20/2022     12/20/2022    BUN 10 12/20/2022    CREATININE 1.05 12/20/2022    BCR 9.5 12/20/2022    ANIONGAP 6.0 12/20/2022       Lab Results   Component Value Date    WBC 8.58 12/20/2022    HGB 12.3 (L) 12/20/2022    HCT 36.7 (L) 12/20/2022    MCV 88.2 12/20/2022     12/20/2022       Images:   NM PET/CT Skull Base to Mid Thigh    Result Date: 2/24/2023  No PET evidence of PyL binding to suggest recurrent or metastatic prostate malignancy. Few tiny pleural-based nodules seen in the lungs measuring up to 3 mm may represent normal intrapulmonary lymph nodes. However recommend attention on follow-up. Electronically Signed: Herbert Shirley  2/24/2023 9:25 AM EST  Workstation ID: [id number]      Measures:   Tobacco:   Fransico Benitezen Jacob  reports that he has never smoked. He has never used smokeless tobacco.         Assessment / Plan      Assessment/Plan:   67 y.o. male who presented today for follow up of biochemically recurrent prostate cancer after radical prostatectomy in December.  5 months out from procedure.  PSMA PET scan negative.  PSA continues to rise, 0.37 currently, has undergone 6-month injection of Lupron depot androgen deprivation therapy.  Is planning for salvage pelvic radiation soon.  Meeting with Dr. Peacock today.  Overall has  regained his bladder control and he rarely leaks, this is great news.  I will see him back in 3 months after completion of radiation with a PSA prior.    Diagnoses and all orders for this visit:    1. Recurrent prostate cancer (Primary)  -     PSA Diagnostic; Future           Follow Up:   Return in about 3 months (around 8/4/2023) for Follow Up after Labs.    I spent approximately 20 minutes providing clinical care for this patient; including review of patient's chart and provider documentation, face to face time spent with patient in examination room (obtaining history, performing physical exam, discussing diagnosis and management options), placing orders, and completing patient documentation.     Deondre Ocasio MD  [facility] [place]

## 2023-05-18 PROCEDURE — 77338 DESIGN MLC DEVICE FOR IMRT: CPT | Performed by: RADIOLOGY

## 2023-05-18 PROCEDURE — 77300 RADIATION THERAPY DOSE PLAN: CPT | Performed by: RADIOLOGY

## 2023-05-18 PROCEDURE — 77301 RADIOTHERAPY DOSE PLAN IMRT: CPT | Performed by: RADIOLOGY

## 2023-05-24 ENCOUNTER — HOSPITAL ENCOUNTER (OUTPATIENT)
Dept: RADIATION ONCOLOGY | Facility: HOSPITAL | Age: 68
Discharge: HOME | End: 2023-05-24

## 2023-05-24 PROCEDURE — 77385: CPT | Performed by: RADIOLOGY

## 2023-05-25 ENCOUNTER — HOSPITAL ENCOUNTER (OUTPATIENT)
Dept: RADIATION ONCOLOGY | Facility: HOSPITAL | Age: 68
Discharge: HOME | End: 2023-05-25

## 2023-05-25 PROCEDURE — 77385: CPT | Performed by: RADIOLOGY

## 2023-05-26 ENCOUNTER — HOSPITAL ENCOUNTER (OUTPATIENT)
Dept: RADIATION ONCOLOGY | Facility: HOSPITAL | Age: 68
Discharge: HOME | End: 2023-05-26

## 2023-05-26 PROCEDURE — 77336 RADIATION PHYSICS CONSULT: CPT | Performed by: RADIOLOGY

## 2023-05-26 PROCEDURE — 77385: CPT | Performed by: RADIOLOGY

## 2023-05-30 ENCOUNTER — HOSPITAL ENCOUNTER (OUTPATIENT)
Dept: RADIATION ONCOLOGY | Facility: HOSPITAL | Age: 68
Discharge: HOME | End: 2023-05-30

## 2023-05-30 ENCOUNTER — DOCUMENTATION (OUTPATIENT)
Dept: NUTRITION | Facility: HOSPITAL | Age: 68
End: 2023-05-30

## 2023-05-30 VITALS — BODY MASS INDEX: 24.63 KG/M2 | WEIGHT: 176.6 LBS

## 2023-05-30 PROCEDURE — 77385: CPT | Performed by: RADIOLOGY

## 2023-05-30 NOTE — PROGRESS NOTES
ONC Nutrition    Diagnosis:   Stage IIIB (pT3a, pN0, cM0, PSA: 5.6, Grade Group: 3)    Surgery: Status post prostatectomy 12/19/2022     Treatment Plan:   Salvage pelvic irradiation and concurrent short course androgen ablation for optimal tumor control /  70 Diop delivered in 35 daily fractions over 7 weeks / concurrently, the adjacent lower pelvic lymphatics and seminal vesicle regions will receive a dose of approximately 56 Diop    Weight: 176.6 lbs    Patient completed 4th treatment today.  Discussed the possible nutritionally related side effects of treatment that he may experience with progression of treatment, including gas, bowel changes and fatigue.  Encouraged focused on a healthy diet with a wide range of foods, higher protein intake and adequate hydration.  Provided samples of ONS that patient may find beneficial to supplement oral intake, as recall of oral intake indicates that protein intake and caloric intake is insufficient to meet needs.    Patient begins treatment with no issues.  Will follow as indicated.

## 2023-05-31 ENCOUNTER — HOSPITAL ENCOUNTER (OUTPATIENT)
Dept: RADIATION ONCOLOGY | Facility: HOSPITAL | Age: 68
Discharge: HOME | End: 2023-05-31

## 2023-05-31 PROCEDURE — 77385: CPT | Performed by: RADIOLOGY

## 2023-06-01 ENCOUNTER — HOSPITAL ENCOUNTER (OUTPATIENT)
Dept: RADIATION ONCOLOGY | Facility: HOSPITAL | Age: 68
Discharge: HOME | End: 2023-06-01

## 2023-06-02 ENCOUNTER — HOSPITAL ENCOUNTER (OUTPATIENT)
Dept: RADIATION ONCOLOGY | Facility: HOSPITAL | Age: 68
Discharge: HOME | End: 2023-06-02

## 2023-06-05 ENCOUNTER — HOSPITAL ENCOUNTER (OUTPATIENT)
Dept: RADIATION ONCOLOGY | Facility: HOSPITAL | Age: 68
Discharge: HOME | End: 2023-06-05

## 2023-06-06 ENCOUNTER — HOSPITAL ENCOUNTER (OUTPATIENT)
Dept: RADIATION ONCOLOGY | Facility: HOSPITAL | Age: 68
Discharge: HOME | End: 2023-06-06

## 2023-06-06 VITALS — WEIGHT: 173.1 LBS | BODY MASS INDEX: 24.14 KG/M2

## 2023-06-07 ENCOUNTER — HOSPITAL ENCOUNTER (OUTPATIENT)
Dept: RADIATION ONCOLOGY | Facility: HOSPITAL | Age: 68
Discharge: HOME | End: 2023-06-07

## 2023-06-08 ENCOUNTER — HOSPITAL ENCOUNTER (OUTPATIENT)
Dept: RADIATION ONCOLOGY | Facility: HOSPITAL | Age: 68
Discharge: HOME | End: 2023-06-08

## 2023-06-09 ENCOUNTER — HOSPITAL ENCOUNTER (OUTPATIENT)
Dept: RADIATION ONCOLOGY | Facility: HOSPITAL | Age: 68
Discharge: HOME | End: 2023-06-09

## 2023-06-12 ENCOUNTER — HOSPITAL ENCOUNTER (OUTPATIENT)
Dept: RADIATION ONCOLOGY | Facility: HOSPITAL | Age: 68
Discharge: HOME | End: 2023-06-12

## 2023-06-13 ENCOUNTER — HOSPITAL ENCOUNTER (OUTPATIENT)
Dept: RADIATION ONCOLOGY | Facility: HOSPITAL | Age: 68
Discharge: HOME | End: 2023-06-13

## 2023-06-13 VITALS — WEIGHT: 172 LBS | BODY MASS INDEX: 23.99 KG/M2

## 2023-06-14 ENCOUNTER — HOSPITAL ENCOUNTER (OUTPATIENT)
Dept: RADIATION ONCOLOGY | Facility: HOSPITAL | Age: 68
Discharge: HOME | End: 2023-06-14

## 2023-06-15 ENCOUNTER — HOSPITAL ENCOUNTER (OUTPATIENT)
Dept: RADIATION ONCOLOGY | Facility: HOSPITAL | Age: 68
Discharge: HOME | End: 2023-06-15

## 2023-06-16 ENCOUNTER — HOSPITAL ENCOUNTER (OUTPATIENT)
Dept: RADIATION ONCOLOGY | Facility: HOSPITAL | Age: 68
Discharge: HOME | End: 2023-06-16

## 2023-07-03 ENCOUNTER — HOSPITAL ENCOUNTER (OUTPATIENT)
Dept: RADIATION ONCOLOGY | Facility: HOSPITAL | Age: 68
Setting detail: RADIATION/ONCOLOGY SERIES
Discharge: HOME | End: 2023-07-03
Payer: MEDICARE

## 2023-07-05 PROCEDURE — 77385: CPT | Performed by: RADIOLOGY

## 2023-07-07 PROCEDURE — 77385: CPT | Performed by: RADIOLOGY

## 2023-07-10 PROCEDURE — 77385: CPT | Performed by: RADIOLOGY

## 2023-07-11 PROCEDURE — 77385: CPT | Performed by: RADIOLOGY

## 2023-07-13 PROCEDURE — 77336 RADIATION PHYSICS CONSULT: CPT | Performed by: RADIOLOGY

## 2023-07-14 PROCEDURE — 77385: CPT | Performed by: RADIOLOGY

## 2023-07-15 PROCEDURE — 77385: CPT | Performed by: RADIOLOGY

## 2023-07-17 PROCEDURE — 77385: CPT | Performed by: RADIOLOGY

## 2023-07-18 PROCEDURE — 77385: CPT | Performed by: RADIOLOGY

## 2023-08-07 ENCOUNTER — LAB (OUTPATIENT)
Dept: LAB | Facility: HOSPITAL | Age: 68
End: 2023-08-07
Payer: MEDICARE

## 2023-08-07 DIAGNOSIS — C61 RECURRENT PROSTATE CANCER: ICD-10-CM

## 2023-08-07 PROCEDURE — 84153 ASSAY OF PSA TOTAL: CPT

## 2023-08-07 PROCEDURE — 36415 COLL VENOUS BLD VENIPUNCTURE: CPT

## 2023-08-08 LAB — PSA SERPL-MCNC: <0.014 NG/ML (ref 0–4)

## 2023-08-10 ENCOUNTER — OFFICE VISIT (OUTPATIENT)
Dept: UROLOGY | Facility: CLINIC | Age: 68
End: 2023-08-10
Payer: MEDICARE

## 2023-08-10 VITALS — BODY MASS INDEX: 24.69 KG/M2 | HEIGHT: 71 IN

## 2023-08-10 DIAGNOSIS — N52.31 ERECTILE DYSFUNCTION AFTER RADICAL PROSTATECTOMY: ICD-10-CM

## 2023-08-10 DIAGNOSIS — C61 RECURRENT PROSTATE CANCER: Primary | ICD-10-CM

## 2023-08-10 LAB
BILIRUB BLD-MCNC: NEGATIVE MG/DL
CLARITY, POC: CLEAR
COLOR UR: YELLOW
EXPIRATION DATE: NORMAL
GLUCOSE UR STRIP-MCNC: NEGATIVE MG/DL
KETONES UR QL: NEGATIVE
LEUKOCYTE EST, POC: NEGATIVE
Lab: NORMAL
NITRITE UR-MCNC: NEGATIVE MG/ML
PH UR: 6 [PH] (ref 5–8)
PROT UR STRIP-MCNC: NEGATIVE MG/DL
RBC # UR STRIP: NEGATIVE /UL
SP GR UR: 1.02 (ref 1–1.03)
UROBILINOGEN UR QL: NORMAL

## 2023-08-10 NOTE — PROGRESS NOTES
Follow Up Office Visit      Patient Name: Fransico James  : 1955   MRN: [id number]     Chief Complaint:    Chief Complaint   Patient presents with    Recurrent Prostate Cancer       Referring Provider: No ref. provider found    History of Present Illness: Fransico James is a 67 y.o. male who presents today for follow up of for follow up of biochemically recurrent prostate cancer.     He underwent robotic assisted laparoscopic prostatectomy, right nerve sparing, left wide dissection, bilateral pelvic lymph node dissection on 2022.    Pathology high risk: Surgical pathology reveals [id number] Maxi 4+3=7 disease with focal EPE. He had 5% tertiary pattern 5 disease.  This represents significant disease upgrading.  Surgical pathology demonstrated negative margins.       His PSA had continued to slowly rise.  He underwent Lupron injection in May, 2023. He underwent a PSMA PET scan on 2023 which demonstrated no obvious evidence of metastatic disease. He has completed salvage pelvic radiation on 23. He reports significant fatigue since radiation, however this is improving. He reports one episode of dysuria s/p radiation that has resolved. He reports occasional stress urinary incontinence that he is wearing a small pad for. He has been wearing on Kegel exercises daily and feels he is generally able to control his bladder function well.     23; PSA <0.014.   Bladder scan PVR 0cc.     Most recent PSA is undetectable, great news!     Lab Results   Component Value Date    PSA <0.014 2023    PSA 0.371 2023    PSA 0.305 2023    PSA 0.333 2023     He has expected erectile dysfunction after prostatectomy, refractory to oral PDE 5 inhibitors.  He is interested in Trimix injections which we have previously discussed.  He is unable to achieve any erection at all.      Subjective      Review of System: Review of Systems   Genitourinary:  Negative for flank pain,  "frequency, hematuria and urgency.        Occasional stress urinary incontinence.     I have reviewed the ROS documented by my clinical staff, I have updated appropriately and I agree. Deondre Ocasio MD    I have reviewed and the following portions of the patient's history were updated as appropriate: past family history, past medical history, past social history, past surgical history and problem list.    Medications:     Current Outpatient Medications:     donepezil (ARICEPT) 10 MG tablet, Take 1 tablet by mouth Daily., Disp: , Rfl:     Incontinence Supply Disposable (Comfort Protect Adlt Diaper/XL) misc, 1 each Daily., Disp: 30 each, Rfl: 5    multivitamin with minerals tablet tablet, Take 1 tablet by mouth Daily. Indications: ok to restart in 3-5 days, Disp: , Rfl:     Allergies:   Allergies   Allergen Reactions    Penicillins Rash     Tolerates keflex and cephalosporins     Sulfa Antibiotics Rash           Post void residual bladder scan:   0 mL     Objective     Physical Exam:   Vital Signs:   Vitals:    08/10/23 1112   Height: 180.3 cm (71\")     Body mass index is 24.69 kg/mý.     Physical Exam  Vitals and nursing note reviewed.   Constitutional:       Appearance: Normal appearance.   HENT:      Head: Normocephalic and atraumatic.      Nose: Nose normal.      Mouth/Throat:      Mouth: Mucous membranes are moist.   Eyes:      Pupils: Pupils are equal, round, and reactive to light.   Pulmonary:      Effort: Pulmonary effort is normal.   Abdominal:      General: Abdomen is flat.      Palpations: Abdomen is soft.      Comments: Robotic port site incisions well approximated with scar, no hernia   Musculoskeletal:         General: Normal range of motion.      Cervical back: Normal range of motion.   Skin:     General: Skin is warm and dry.      Capillary Refill: Capillary refill takes less than 2 seconds.   Neurological:      General: No focal deficit present.      Mental Status: He is alert.   Psychiatric:    "      Mood and Affect: Mood normal.     Labs:   Brief Urine Lab Results  (Last result in the past 365 days)        Color   Clarity   Blood   Leuk Est   Nitrite   Protein   CREAT   Urine HCG        08/10/23 1124 Yellow   Clear   Negative   Negative   Negative   Negative                   Urine Culture          2/9/2023    11:00   Urine Culture   Urine Culture No growth         Lab Results   Component Value Date    GLUCOSE 96 12/20/2022    CALCIUM 8.4 (L) 12/20/2022     12/20/2022    K 3.6 12/20/2022    CO2 28.0 12/20/2022     12/20/2022    BUN 10 12/20/2022    CREATININE 1.05 12/20/2022    BCR 9.5 12/20/2022    ANIONGAP 6.0 12/20/2022       Lab Results   Component Value Date    WBC 8.58 12/20/2022    HGB 12.3 (L) 12/20/2022    HCT 36.7 (L) 12/20/2022    MCV 88.2 12/20/2022     12/20/2022       Images:   No Images in the past 120 days found..    Measures:   Tobacco:   Fransico James  reports that he has never smoked. He has never used smokeless tobacco..     Urine Incontinence: Patient reports that he is occasionally experiencing any symptoms of stress urinary incontinence.     Assessment / Plan      Assessment/Plan:   67 y.o. male who presented today for follow up of recurrent prostate cancer s/p robotic assisted laparoscopic prostatectomy and salvage pelvic radiation plus ADT. PSA <0.014.     He is pleased with his overall improvement in his urinary symptoms and reports occasional stress urinary incontinence. He will follow up in 6 months with PSA prior.     Diagnoses and all orders for this visit:    1. Recurrent prostate cancer (Primary)  -     POC Urinalysis Dipstick, Automated  -     PSA Total+% Free (Serial); Future  -     Testosterone; Future    2. Erectile dysfunction after radical prostatectomy  -Patient is interested in intracavernosal injections with Trimix we will send this through a compounding pharmacy and have him follow-up with nurse practitioner in 2 weeks for teaching and test  dose     Follow Up:   Return in about 2 weeks (around 8/24/2023) for Follow Up after Labs.    I spent approximately 20 minutes providing clinical care for this patient; including review of patient's chart and provider documentation, face to face time spent with patient in examination room (obtaining history, performing physical exam, discussing diagnosis and management options), placing orders, and completing patient documentation.     Deondre Ocasio MD  [facility]

## 2023-08-15 ENCOUNTER — TELEPHONE (OUTPATIENT)
Dept: UROLOGY | Facility: CLINIC | Age: 68
End: 2023-08-15
Payer: MEDICARE

## 2023-08-15 NOTE — TELEPHONE ENCOUNTER
Hub staff attempted to follow warm transfer process and was unsuccessful     Caller: Fransico James    Relationship to patient: Self    Best call back number: [phone]    Patient is needing: COMING TO CLASS ON TRIMIX AND CURIOUS TO KNOW IF HE HAS TO HAVE PRESCRIPTION THAT WAS ORDERED OR ARE THERE SAMPLES IN OFFICE AVAILABLE (9/8/23)    OK TO LEAVE VM

## 2023-08-15 NOTE — TELEPHONE ENCOUNTER
I called the patient and left a voicemail to let him know he does need to bring his trimix presciption to his appointment with him and that we didn't have samples in the office.

## 2023-08-22 ENCOUNTER — HOSPITAL ENCOUNTER (OUTPATIENT)
Dept: RADIATION ONCOLOGY | Facility: HOSPITAL | Age: 68
Setting detail: RADIATION/ONCOLOGY SERIES
Discharge: HOME | End: 2023-08-22
Payer: MEDICARE

## 2023-08-22 ENCOUNTER — OFFICE VISIT (OUTPATIENT)
Dept: RADIATION ONCOLOGY | Facility: HOSPITAL | Age: 68
End: 2023-08-22
Payer: MEDICARE

## 2023-08-22 DIAGNOSIS — C61 PROSTATE CANCER: Primary | ICD-10-CM

## 2023-08-22 NOTE — PROGRESS NOTES
TELEMEDICINE FOLLOW UP NOTE    PATIENT:                                                      Fransico James  MEDICAL RECORD #:                        [id number]  :                                                          1955  COMPLETION DATE:   2023  DIAGNOSIS:     Prostate cancer  - Clinical: Stage IIB (cT1c, cN0, cM0, PSA: 5.6, Grade Group: 2)  - Pathologic: Stage IIIB (pT3a, pN0, cM0, PSA: 5.6, Grade Group: 3)      This visit has been converted to a telehealth virtual visit, the patient's preferred method for today's follow-up.  Total time of discussion was 29 minutes.  The patient has given verbal consent.      BRIEF HISTORY:    Initial follow-up visit.  He was initially diagnosed with low intermediate risk prostate cancer and chose to undergo initial definitive treatment with prostatectomy performed 2022.  Final pathology showed higher tumor grade, Jolo's 4+3=7 with tertiary pattern 5 (less than 5%).  There was perineural invasion present and focal extraprostatic extension on the left anterior and left posterior.  Lymph nodes were negative for metastasis and there was no seminal vesicle invasion.  Margins were negative.  There was no angiolymphatic invasion.  PSA was persistently detectable postoperatively and john to a value of 0.371 ng/ml, consistent with biochemical recurrence of disease.  Restaging with postoperative Pylarify PSMA PET/CT showed no foci of hypermetabolism.  The patient underwent salvage pelvic radiation and concurrent short course androgen ablation, with 6-month LHRH agonist injection given 2023.  The prostate surgery bed received a dose of 70 Gray in 35 daily fractions, and concurrently the adjacent lower pelvic lymphatics and seminal vesicle regions received a minimum dose of 56 Diop.  He completed IMRT radiotherapy on 2023.  He tolerated treatment well.  He experienced 1 initial episode of mild dysuria that was self-limited and resolved.    He otherwise  reports bladder control is continuing to improve.  No worsening lower urinary tract symptoms.  He reports occasional stress urinary incontinence and continues to wear a thin pad for protection.  He continues Kegel exercises daily.  He did experience a few mildly loose stools, managed by diet modifications.  He notes seldom hot flashes related to hormone therapy and no additional side effects other than some fatigue which was exacerbated related to radiation, but this is overall improving.  He is using Trimix intracavernosal injections for ED refractory to PDE 5 inhibitors.  Repeat PSA on 8/7/2023 was <0.014 ng/ml.          IPSS Questionnaire (AUA-7):  Over the past month.     1)  Incomplete Emptying  How often have you had a sensation of not emptying your bladder?  0 - Not at all   2)  Frequency  How often have you had to urinate less than every two hours? 5 - Almost always   3)  Intermittency  How often have you found you stopped and started again several times when you urinated?  4 - More than half the time   4) Urgency  How often have you found it difficult to postpone urination?  3 - About half the time   5) Weak Stream  How often have you had a weak urinary stream?  3 - About half the time   6) Straining  How often have you had to push or strain to begin urination?  2 - Less than half the time   7) Nocturia  How many times did you typically get up at night to urinate?  4 - 4 times   Total Score:  21         Quality of life due to urinary symptoms:  If you were to spend the rest of your life with your urinary condition the way it is now, how would you feel about that? 3-Mixed   Urine Leakage (Incontinence) 2-Mild (More than a few drops a day, 1-2 pads/day)      Sexual Health Inventory  Current Status     1)  How do you rate your confidence that you could achieve and keep an erection? 1-Very Low   2) When you had erections with sexual stimulation, how often were your erections hard enough for penetration  (entering your partner)? 0-No sexual activity   3)  During sexual intercourse, how often were you able to maintain your erection after you had penetrated (entered) into your partner? 0-Did not attempt intercourse   4) During sexual intercourse, how difficult was it to maintain your erection to completion of intercourse? 0-Did not attempt intercourse   5) When you attempted sexual intercourse, how often was it satisfactory to you? 0-No sexual activity   Total Score: 1         Bowel Health Inventory  Current Status: 0-No problems, no rectal bleeding, no discharge, less then 5 bowel movements a day         MEDICATIONS: Medication reconciliation for the patient was reviewed and confirmed in the electronic medical record.    Review of Systems   Constitutional:  Positive for fatigue.   Genitourinary:  Positive for bladder incontinence (occasional JO, improving), frequency and nocturia.          KPS 80%      Physical Exam  Pulmonary:      Respirations even, unlabored. No audible wheezing or cough.  Neurological:      A+Ox4, conversant, answers questions appropriately.  Psychiatric:     Judgement, affect, and decision-making WNL.    Limited physical exam as visit was conducted remotely via telephone.            The following portions of the patient's history were reviewed and updated as appropriate: allergies, current medications, past family history, past medical history, past social history, past surgical history and problem list.         Diagnoses and all orders for this visit:    1. Prostate cancer (Primary)         IMPRESSION:  Prostate cancer, initially diagnosed 8/18/2022 with Maxi's 3+4=7, clinical stage IIB (T1c, N0, M0), PSA 5.6 ng/ml.  He is status post prostatectomy 12/19/2022 which found higher tumor grade, Gorham's 4+3 = 7 with tertiary pattern 5, pathologic stage IIIB (T3a, N0, M0).  Fortunately surgical margins were negative; however, he had evidence of capsular invasion on the left and PSA has remained  measurable with value 0.371 ng/ml indicating presence of residual prostate cancer in spite of a negative postoperative PSMA PET/CT.  He underwent short course androgen ablation and concurrent salvage pelvic irradiation, which he completed 1 month ago.  He tolerated treatment well.  He developed the anticipated acute grade 1 urinary and bowel side effects as well as exacerbation of fatigue, which continue to gradually improve.  He continues to have improved bladder control and continues daily Kegel exercises.  Excellent early biochemical response with now undetectable posttreatment PSA.    Mr. James and I have reviewed the survivorship care plan in detail.  We discussed diagnosis, follow-up intervals, biannual PSA monitoring, and continued expectations for response to treatment including repeat imaging only as clinically indicated, such as detectable/rising PSA.  A copy of the care plan has been mailed to the patient.  A copy has also been sent to his PCP.      RECOMMENDATIONS:   Mr. James continues routine urologic surveillance under the care of Dr. Ocasio, with follow-up and repeat PSA scheduled 2/12/2024.  We will schedule the patient to return to our clinic in 6 months.      Return in about 6 months (around 2/22/2024) for Office Visit.    COLE Hopkins spent a total of 45 minutes on today's visit, with more than 29 minutes in virtual communication with the patient via telephone, and the remainder of the time spent in reviewing the relevant history, records, available imaging, and for documentation.

## 2023-09-05 ENCOUNTER — TELEPHONE (OUTPATIENT)
Dept: UROLOGY | Facility: CLINIC | Age: 68
End: 2023-09-05
Payer: MEDICARE

## 2023-09-05 NOTE — TELEPHONE ENCOUNTER
Patient is currently on Lupron injections, and is suppose to see you on 9/8 for trimix teaching and wants to know if the two will have and interactions with each other?

## 2023-09-07 NOTE — TELEPHONE ENCOUNTER
I called the patient to let him know that there is no interaction between lupron and trimix so they are ok to be used together.  Patient said that he had to cancel his appointment until a later date due to personal issues but will reschedule as soon as he can.

## 2024-01-25 ENCOUNTER — OFFICE VISIT (OUTPATIENT)
Dept: UROLOGY | Facility: CLINIC | Age: 69
End: 2024-01-25
Payer: MEDICARE

## 2024-01-25 VITALS — BODY MASS INDEX: 24.78 KG/M2 | WEIGHT: 177 LBS | HEIGHT: 71 IN

## 2024-01-25 DIAGNOSIS — N52.31 ERECTILE DYSFUNCTION AFTER RADICAL PROSTATECTOMY: Primary | ICD-10-CM
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
## 2024-01-26 ENCOUNTER — TELEPHONE (OUTPATIENT)
Dept: UROLOGY | Facility: CLINIC | Age: 69
End: 2024-01-26
Payer: MEDICARE

## 2024-01-26 NOTE — TELEPHONE ENCOUNTER
Pt called said after his Trimix injection he was having a lot of pain and his erection had not gone down 3-4 hours after the injection in office. Pt present to ER and they consulted with their urologist on call who recommended injection vs. Aspiration. Pt symptoms had began to resolve upon presentation to ER and Pt was able to return home.

## 2024-01-26 NOTE — TELEPHONE ENCOUNTER
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 Had over 6 hours of erection despite sexual activity (without orgasm). He clarifies, this was after the in-office dose of 15 units, no additional administered.     He took 60mg IR pseudoephedrine, relaxed, and applied ice packs. After 30 minutes, repeated 60mg IR pseudoephedrine. Erection remained and was painful, so he went to [facility]. He states that in the parking lot, his erection began receding. The Urologist there was consulted and recommend IM terbutaline. Erection resolved.    Recommend that for next use, he use 5 units. If priapism occurs again, repeat above actions, but take 120mg IR pseudoephedrine in single dose. Advised we may have to use Bimix and/or much lower dose of ICI in the future.

## 2024-02-12 ENCOUNTER — LAB (OUTPATIENT)
Dept: LAB | Facility: HOSPITAL | Age: 69
End: 2024-02-12
Payer: MEDICARE

## 2024-02-12 ENCOUNTER — OFFICE VISIT (OUTPATIENT)
Dept: UROLOGY | Facility: CLINIC | Age: 69
End: 2024-02-12
Payer: MEDICARE

## 2024-02-12 VITALS — HEIGHT: 71 IN | OXYGEN SATURATION: 98 % | WEIGHT: 176 LBS | HEART RATE: 69 BPM | BODY MASS INDEX: 24.64 KG/M2

## 2024-02-12 DIAGNOSIS — N52.31 ERECTILE DYSFUNCTION AFTER RADICAL PROSTATECTOMY: ICD-10-CM

## 2024-02-12 DIAGNOSIS — C61 PROSTATE CANCER: Primary | ICD-10-CM

## 2024-02-12 DIAGNOSIS — C61 RECURRENT PROSTATE CANCER: ICD-10-CM

## 2024-02-12 DIAGNOSIS — C61 PROSTATE CANCER: ICD-10-CM

## 2024-02-12 LAB
PSA SERPL-MCNC: <0.014 NG/ML (ref 0–4)
TESTOST SERPL-MCNC: 128 NG/DL (ref 193–740)

## 2024-02-12 PROCEDURE — 84153 ASSAY OF PSA TOTAL: CPT

## 2024-02-12 PROCEDURE — 1159F MED LIST DOCD IN RCRD: CPT | Performed by: STUDENT IN AN ORGANIZED HEALTH CARE EDUCATION/TRAINING PROGRAM

## 2024-02-12 PROCEDURE — 36415 COLL VENOUS BLD VENIPUNCTURE: CPT

## 2024-02-12 PROCEDURE — 99214 OFFICE O/P EST MOD 30 MIN: CPT | Performed by: STUDENT IN AN ORGANIZED HEALTH CARE EDUCATION/TRAINING PROGRAM

## 2024-02-12 PROCEDURE — 84403 ASSAY OF TOTAL TESTOSTERONE: CPT

## 2024-02-12 PROCEDURE — 1160F RVW MEDS BY RX/DR IN RCRD: CPT | Performed by: STUDENT IN AN ORGANIZED HEALTH CARE EDUCATION/TRAINING PROGRAM

## 2024-02-13 ENCOUNTER — TELEPHONE (OUTPATIENT)
Dept: UROLOGY | Facility: CLINIC | Age: 69
End: 2024-02-13
Payer: MEDICARE

## 2024-02-22 ENCOUNTER — HOSPITAL ENCOUNTER (OUTPATIENT)
Dept: RADIATION ONCOLOGY | Facility: HOSPITAL | Age: 69
Setting detail: RADIATION/ONCOLOGY SERIES
Discharge: HOME | End: 2024-02-22
Payer: MEDICARE

## 2024-06-17 ENCOUNTER — TELEPHONE (OUTPATIENT)
Dept: UROLOGY | Facility: CLINIC | Age: 69
End: 2024-06-17
Payer: MEDICARE

## 2024-06-17 NOTE — TELEPHONE ENCOUNTER
Provider: DR COLVIN    Caller: EDWARD HANSEN    Relationship to Patient: SELF    Reason for Call: PT RECVD LETTER TO SCHEDULED FOLLOW UP CAT SCAN FROM HIS PET SCAN THAT WAS COMPLETED LAST YEAR.    PT IS UNABLE TO FIND PAPERWORK TO SCHEDULE THIS SCAN.    PLEASE CALL PT TO CONFIRM HOW TO PROCEED.

## 2024-06-18 NOTE — TELEPHONE ENCOUNTER
HUB CAN RELAY MESSAGE     Called and LVM for PT that I checked with Dr. Ocasio and no other imaging was necessary prior to his appointment and PT is all set for his appointment in August.

## 2024-06-28 ENCOUNTER — TELEPHONE (OUTPATIENT)
Dept: UROLOGY | Facility: CLINIC | Age: 69
End: 2024-06-28
Payer: MEDICARE

## 2024-06-28 NOTE — TELEPHONE ENCOUNTER
HUB OKAY TO RESCHEDULE: LVM FOR PATIENT TO RESCHEDULE 8/12 APPT DUE TO DR. COLVIN AT [place] LOCATION THIS DAY. SEEING IF PATIENT WOULD LIKE TO MOVE TO [place] OR RESCHEDULE ANOTHER DAY AT [facility].

## 2024-08-06 ENCOUNTER — LAB (OUTPATIENT)
Facility: HOSPITAL | Age: 69
End: 2024-08-06
Payer: MEDICARE

## 2024-08-06 DIAGNOSIS — C61 PROSTATE CANCER: ICD-10-CM

## 2024-08-06 PROCEDURE — 36415 COLL VENOUS BLD VENIPUNCTURE: CPT

## 2024-08-06 PROCEDURE — 84153 ASSAY OF PSA TOTAL: CPT

## 2024-08-07 LAB — PSA SERPL-MCNC: <0.014 NG/ML (ref 0–4)

## 2024-08-12 ENCOUNTER — OFFICE VISIT (OUTPATIENT)
Age: 69
End: 2024-08-12
Payer: MEDICARE

## 2024-08-12 VITALS
SYSTOLIC BLOOD PRESSURE: 144 MMHG | HEART RATE: 53 BPM | OXYGEN SATURATION: 99 % | DIASTOLIC BLOOD PRESSURE: 82 MMHG | HEIGHT: 71 IN | BODY MASS INDEX: 24.56 KG/M2

## 2024-08-12 DIAGNOSIS — N52.31 ERECTILE DYSFUNCTION AFTER RADICAL PROSTATECTOMY: ICD-10-CM

## 2024-08-12 DIAGNOSIS — C61 RECURRENT PROSTATE CANCER: Primary | ICD-10-CM

## 2024-08-12 DIAGNOSIS — N48.33 DRUG-INDUCED PRIAPISM: ICD-10-CM

## 2024-08-12 RX ORDER — TADALAFIL 20 MG/1
20 TABLET ORAL AS NEEDED
Qty: 15 TABLET | Refills: 4 | Status: SHIPPED | OUTPATIENT
Start: 2024-08-12

## 2024-08-12 RX ORDER — KRILL/OM-3/DHA/EPA/PHOSPHO/AST 500-110 MG
CAPSULE ORAL DAILY
COMMUNITY
End: 2024-08-12 | Stop reason: SDUPTHER

## 2024-08-12 RX ORDER — AMOXICILLIN 250 MG
CAPSULE ORAL DAILY
COMMUNITY
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
## 2024-09-03 ENCOUNTER — TELEPHONE (OUTPATIENT)
Dept: UROLOGY | Facility: CLINIC | Age: 69
End: 2024-09-03

## 2024-09-03 NOTE — TELEPHONE ENCOUNTER
Caller: EDWARD HANSEN     Relationship: SELF    Best call back number:[phone] (home)      Which medication are you concerned about: RENETTA    Who prescribed you this medication: DR COLVIN    When did you start taking this medication: 8/12/24    What are your concerns: PT FEELS THIS MEDICATION IS NOT WORKING AND WOULD LIKE TO CONTINUE TRIMIX BUT MAYBE NEED TO CHANGE DOSAGE AMOUNT. PLEASE CALL PT BACK TO DISCUSS. THANK YOU.

## 2024-09-05 ENCOUNTER — TELEPHONE (OUTPATIENT)
Dept: UROLOGY | Facility: CLINIC | Age: 69
End: 2024-09-05

## 2024-09-05 NOTE — TELEPHONE ENCOUNTER
Caller: EDWARD HANSEN    Relationship to patient: SELF    Best call back number:     [phone]       Patient is needing: PT CALLED IN IN REGARD TO HIS TRIMIX MED.  PT HAS NOT RECEIVED ANSWER FROM 9/3/24 MESSAGE ASKING ABOUT CONTINUANCE

## 2024-09-05 NOTE — TELEPHONE ENCOUNTER
Called pt and informed him Dr. Ocasio is still working on changing the dosage of the trimix. Pt states at last office visit, lowering the dose to 5 units was discussed. Pt also reports cialis not helping. Informed pt we will give him a call back once we receive Dr. Ocasio's response. Pt verbalized understanding.

## 2024-09-09 ENCOUNTER — TELEPHONE (OUTPATIENT)
Dept: UROLOGY | Facility: CLINIC | Age: 69
End: 2024-09-09
Payer: MEDICARE

## 2024-09-09 NOTE — TELEPHONE ENCOUNTER
Caller: Fransico James     Relationship: [unfilled] SELF    Best call back number: [phone]    What is your medical concern? CIAANTONINOS IS NOT WORKING. HE WOULD LIKE TO TRY A SMALLER DOSE OF TRIMIX. PLEASE CALL PT TO DISCUSS. THANK YOU

## 2024-09-09 NOTE — TELEPHONE ENCOUNTER
Patient has a history of priapism related to Trimix injections.  He wants a lower dose.  I will send the lowest dose form of Trimix to compounding pharmacy in [place] in addition to phenylephrine that he can self inject for priapism.  Risk discussed.  He is amenable to initiating, we discussed starting at 3 to 5 units.    Deondre Ocasio MD

## 2025-02-12 ENCOUNTER — TELEPHONE (OUTPATIENT)
Age: 70
End: 2025-02-12
Payer: MEDICARE

## 2025-02-12 NOTE — TELEPHONE ENCOUNTER
Called and spoke with patient regarding labs needed prior to 2/17 appt. Patient asked for lab order to be faxed to [facility]..    Called and got fax #, # provided on recording was [phone]... spoke with , she provided [phone], faxed order to both numbers.   Confirmed on [phone]

## 2025-02-17 ENCOUNTER — OFFICE VISIT (OUTPATIENT)
Age: 70
End: 2025-02-17
Payer: MEDICARE

## 2025-02-17 DIAGNOSIS — C61 PROSTATE CANCER: Primary | ICD-10-CM

## 2025-02-17 DIAGNOSIS — C61 RECURRENT PROSTATE CANCER: ICD-10-CM

## 2025-02-17 DIAGNOSIS — N52.31 ERECTILE DYSFUNCTION AFTER RADICAL PROSTATECTOMY: ICD-10-CM

## 2025-02-17 PROCEDURE — 99214 OFFICE O/P EST MOD 30 MIN: CPT | Performed by: STUDENT IN AN ORGANIZED HEALTH CARE EDUCATION/TRAINING PROGRAM

## 2025-02-17 PROCEDURE — 1160F RVW MEDS BY RX/DR IN RCRD: CPT | Performed by: STUDENT IN AN ORGANIZED HEALTH CARE EDUCATION/TRAINING PROGRAM

## 2025-02-17 PROCEDURE — 1159F MED LIST DOCD IN RCRD: CPT | Performed by: STUDENT IN AN ORGANIZED HEALTH CARE EDUCATION/TRAINING PROGRAM

## 2025-02-17 RX ORDER — PSEUDOEPHEDRINE HCL 30 MG
1 TABLET ORAL DAILY
COMMUNITY

## 2025-02-17 NOTE — PROGRESS NOTES
Follow Up Office Visit      Patient Name: Fransico James  : 1955   MRN: [id number]     Chief Complaint:    Chief Complaint   Patient presents with    Recurrent prostate cancer       Referring Provider: No ref. provider found    History of Present Illness: Fransico James is a 69 y.o. male who presents today for follow up of prostate cancer.    He underwent robotic assisted laparoscopic prostatectomy, right nerve sparing, left wide dissection, bilateral pelvic lymph node dissection on 2022.     Pathology high risk: Surgical pathology reveals [id number] Dickinson 4+3=7 disease with focal EPE. He had 5% tertiary pattern 5 disease.  This represents significant disease upgrading.  Surgical pathology demonstrated negative margins.       His PSA had continued to slowly rise.     Lab Results   Component Value Date    PSA <0.014 2024    PSA <0.014 2024    PSA <0.014 2023    PSA 0.371 2023    PSA 0.305 2023    PSA 0.333 2023      He underwent Lupron injection in May, 2023.  This was his only Lupron injection.  He underwent a PSMA PET scan on 2023 which demonstrated no obvious evidence of metastatic disease. He has completed salvage pelvic radiation on 23.     His PSA since this time has remained undetectable.  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 Negative for decreased urine volume, difficulty urinating, dysuria, enuresis, flank pain and hematuria.      I have reviewed the ROS documented by my clinical staff, I have updated appropriately and I agree. Deondre Ocasio MD    I have reviewed and the following portions of the patient's history were updated as appropriate: past family history,  past medical history, past social history, past surgical history and problem list.    Medications:     Current Outpatient Medications:     CALCIUM-VITAMIN D PO, Daily., Disp: , Rfl:     docusate sodium 100 MG capsule, Take 1 capsule by mouth Daily., Disp: , Rfl:     donepezil (ARICEPT) 10 MG tablet, Take 1 tablet by mouth Daily., Disp: , Rfl:     GRAPE SEED EXTRACT PO, Daily., Disp: , Rfl:     Incontinence Supply Disposable (Comfort Protect Adlt Diaper/XL) misc, 1 each Daily., Disp: 30 each, Rfl: 5    multivitamin with minerals (ONE DAILY MENS 50+ MULTIVIT PO), Take  by mouth Daily., Disp: , Rfl:     multivitamin with minerals tablet tablet, Take 1 tablet by mouth Daily. Indications: ok to restart in 3-5 days, Disp: , Rfl:     Omega-3 Fatty Acids (OMEGA 3 500 PO), Daily., Disp: , Rfl:     sennosides-docusate (senna-docusate sodium) 8.6-50 MG per tablet, Take  by mouth Daily., Disp: , Rfl:     tadalafil (Cialis) 20 MG tablet, Take 1 tablet by mouth As Needed for Erectile Dysfunction., Disp: 15 tablet, Rfl: 4    Allergies:   Allergies   Allergen Reactions    Penicillins Rash     Tolerates keflex and cephalosporins     Sulfa Antibiotics Rash       IPSS Questionnaire (AUA-7):  Over the past month…    1)  Incomplete Emptying:       How often have you had a sensation of not emptying you had the sensation of not emptying your bladder completely after you finished urinating?  2 - Less than half the time   2)  Frequency:       How often have you had the urinate again less than two hours after you finished urinating?  2 - Less than half the time   3)  Intermittency:       How often have you found you stopped and started again several times when you urinated?   2 - Less than half the time   4) Urgency:      How often have you found it difficult to postpone urination?  2 - Less than half the time   5) Weak Stream:      How often have you had a weak urinary stream?  1 - Less than 1 time in 5   6) Straining:       How often have  you had to push or strain to begin urination?  1 - Less than 1 time in 5   7) Nocturia:      How many times did you most typically get up to urinate from the time you went to bed at night until the time you got up in the morning?  2 - 2 times   Total Score:  12   The International Prostate Symptom Score (IPSS) is used to screen, diagnose, track symptoms of benign prostatic hyperplasia (BPH).   0-7 (Mild Symptoms) 8-19 (Moderate) 20-35 (Severe)   Quality of Life (QoL):  If you were to spend the rest of your life with your urinary condition just the way it is now, how would you feel about that? 4-Mostly Dissatisfied   Urine Leakage (Incontinence) 2-Mild (More than a few drops a day, 1-2 pads/day)       Objective     Physical Exam:   Vital Signs: There were no vitals filed for this visit.  There is no height or weight on file to calculate BMI.     Physical Exam  Constitutional:       Appearance: Normal appearance.   HENT:      Head: Normocephalic and atraumatic.      Nose: Nose normal.   Eyes:      Extraocular Movements: Extraocular movements intact.      Conjunctiva/sclera: Conjunctivae normal.      Pupils: Pupils are equal, round, and reactive to light.   Musculoskeletal:         General: Normal range of motion.      Cervical back: Normal range of motion and neck supple.   Skin:     General: Skin is warm and dry.      Findings: No lesion or rash.   Neurological:      General: No focal deficit present.      Mental Status: He is alert and oriented to person, place, and time. Mental status is at baseline.   Psychiatric:         Mood and Affect: Mood normal.         Behavior: Behavior normal.         Labs:   Brief Urine Lab Results       None                 Lab Results   Component Value Date    GLUCOSE 96 12/20/2022    CALCIUM 8.4 (L) 12/20/2022     12/20/2022    K 3.6 12/20/2022    CO2 28.0 12/20/2022     12/20/2022    BUN 10 12/20/2022    CREATININE 1.05 12/20/2022    EGFRIFAFRI 59 04/30/2024    BCR 9.5  12/20/2022    ANIONGAP 6.0 12/20/2022       Lab Results   Component Value Date    WBC 5.09 01/13/2025    HGB 15.6 01/13/2025    HCT 45.8 01/13/2025    MCV 86 01/13/2025     01/13/2025       Images:   No Images in the past 120 days found..    Measures:   Tobacco:   Fransico James  reports that he has never smoked. He has never been exposed to tobacco smoke. He has never used smokeless tobacco.      Assessment / Plan      Assessment/Plan:   69 y.o. male who presented today for follow up of prostate cancer status post radical prostatectomy.  He had a biochemical persistence after his surgery with a negative PET scan, completed 6-month Lupron and salvage pelvic radiation, since this time his PSA has been undetectable.  He is doing very well.  I will see him back in 1 year with PSA prior.  Patient has post prostatectomy erectile dysfunction managing with bimix, we will send him a refill. His JO has resolved.     Diagnoses and all orders for this visit:    1. Prostate cancer (Primary)  -     PSA Diagnostic; Future    2. Recurrent prostate cancer    3. Erectile dysfunction after radical prostatectomy  - injecting 9 units Bimix with good response  - refills sent  - has a history of priapism with Trimix, risks discussed         Follow Up:   Return in about 1 year (around 2/17/2026) for Follow Up after Labs.    I spent approximately 20 minutes providing clinical care for this patient; including review of patient's chart and provider documentation, face to face time spent with patient in examination room (obtaining history, performing physical exam, discussing diagnosis and management options), placing orders, and completing patient documentation.     Deondre Ocasio MD  [facility] [place], [facility]

## (undated) DEVICE — Device

## (undated) DEVICE — TBG PENCL TELESCP MEGADYNE SMOKE EVAC 10FT

## (undated) DEVICE — 3M™ IOBAN™ 2 ANTIMICROBIAL INCISE DRAPE 6650EZ: Brand: IOBAN™ 2

## (undated) DEVICE — SUT VIC 0 UR6 27IN VCP603H

## (undated) DEVICE — SUT SILK 2/0 PS 18IN 1588H

## (undated) DEVICE — BLADELESS OBTURATOR: Brand: WECK VISTA

## (undated) DEVICE — PATIENT RETURN ELECTRODE, SINGLE-USE, CONTACT QUALITY MONITORING, ADULT, WITH 9FT CORD, FOR PATIENTS WEIGING OVER 33LBS. (15KG): Brand: MEGADYNE

## (undated) DEVICE — CATHETER,FOLEY,100%SILICONE,20FR,10ML,LF: Brand: MEDLINE

## (undated) DEVICE — HLDR CATH FOLEY STATLOCK TRICOT PED 3WY

## (undated) DEVICE — ST TBG CONN PNEUMOCLEAR EVAC SMOKE HEAT/HUMID

## (undated) DEVICE — COLUMN DRAPE

## (undated) DEVICE — DRSNG WND BORDR/ADHS NONADHR/GZ LF 4X4IN STRL

## (undated) DEVICE — TIP COVER ACCESSORY

## (undated) DEVICE — TISSUE RETRIEVAL SYSTEM: Brand: INZII RETRIEVAL SYSTEM

## (undated) DEVICE — ADHS SKIN PREMIERPRO EXOFIN TOPICAL HI/VISC .5ML

## (undated) DEVICE — GLV SURG SENSICARE PI MIC PF SZ7.5 LF STRL

## (undated) DEVICE — GOWN,NON-REINFORCED,SIRUS,SET IN SLV,XL: Brand: MEDLINE

## (undated) DEVICE — BLANKT WARM UPPR/BDY ARM/OUT 57X196CM

## (undated) DEVICE — CATHETER,FOLEY,100%SILICONE,18FR,10ML,LF: Brand: MEDLINE

## (undated) DEVICE — LAP PORT CLOSURE GUIDES 5MM AND 10/12MM: Brand: LAP PORT CLOSURE GUIDES 5MM AND 10/12MM

## (undated) DEVICE — ARM DRAPE

## (undated) DEVICE — ENDOPOUCH RETRIEVER SPECIMEN RETRIEVAL BAGS: Brand: ENDOPOUCH RETRIEVER

## (undated) DEVICE — ENDOPATH XCEL BLADELESS TROCARS WITH STABILITY SLEEVES: Brand: ENDOPATH XCEL

## (undated) DEVICE — SUT VIC 0 TIES 18IN J912G

## (undated) DEVICE — THE ECHELON FLEX POWERED PLUS ARTICULATING ENDOSCOPIC LINEAR CUTTERS ARE STERILE, SINGLE PATIENT USE INSTRUMENTS THAT SIMULTANEOUSLYCUT AND STAPLE TISSUE. THERE ARE SIX STAGGERED ROWS OF STAPLES, THREE ON EITHER SIDE OF THE CUT LINE. THE ECHELON FLEX 45 POWERED PLUSINSTRUMENTS HAVE A STAPLE LINE THAT IS APPROXIMATELY 45 MM LONG AND A CUT LINE THAT IS APPROXIMATELY 42 MM LONG. THE SHAFT CAN ROTATE FREELYIN BOTH DIRECTIONS AND AN ARTICULATION MECHANISM ENABLES THE DISTAL PORTION OF THE SHAFT TO PIVOT TO FACILITATE LATERAL ACCESS TO THE OPERATIVESITE.THE INSTRUMENTS ARE PACKAGED WITH A PRIMARY LITHIUM BATTERY PACK THAT MUST BE INSTALLED PRIOR TO USE. THERE ARE SPECIFIC REQUIREMENTS FORDISPOSING OF THE BATTERY PACK. REFER TO THE BATTERY PACK DISPOSAL SECTION.THE INSTRUMENTS ARE PACKAGED WITHOUT A RELOAD AND MUST BE LOADED PRIOR TO USE. A STAPLE RETAINING CAP ON THE RELOAD PROTECTS THE STAPLE LEGPOINTS DURING SHIPPING AND TRANSPORTATION. THE INSTRUMENTS’ LOCK-OUT FEATURE IS DESIGNED TO PREVENT A USED OR IMPROPERLY INSTALLED RELOADFROM BEING REFIRED OR AN INSTRUMENT FROM BEING FIRED WITHOUT A RELOAD.: Brand: ECHELON FLEX

## (undated) DEVICE — CANNULA SEAL

## (undated) DEVICE — LEGGINGS, PAIR, 29X43, STERILE: Brand: MEDLINE

## (undated) DEVICE — SPNG GZ WOVN 4X4IN 12PLY 10/BX STRL